# Patient Record
Sex: MALE | Race: WHITE | NOT HISPANIC OR LATINO | Employment: FULL TIME | URBAN - NONMETROPOLITAN AREA
[De-identification: names, ages, dates, MRNs, and addresses within clinical notes are randomized per-mention and may not be internally consistent; named-entity substitution may affect disease eponyms.]

---

## 2023-05-07 DIAGNOSIS — E11.9 TYPE 2 DIABETES MELLITUS WITHOUT COMPLICATIONS (MULTI): ICD-10-CM

## 2023-05-08 RX ORDER — ATORVASTATIN CALCIUM 40 MG/1
TABLET, FILM COATED ORAL
Qty: 90 TABLET | Refills: 3 | Status: SHIPPED | OUTPATIENT
Start: 2023-05-08 | End: 2023-06-29 | Stop reason: SDUPTHER

## 2023-05-26 DIAGNOSIS — E11.9 TYPE 2 DIABETES MELLITUS WITHOUT COMPLICATIONS (MULTI): ICD-10-CM

## 2023-05-26 RX ORDER — METFORMIN HYDROCHLORIDE 1000 MG/1
TABLET ORAL
Qty: 180 TABLET | Refills: 1 | Status: SHIPPED | OUTPATIENT
Start: 2023-05-26 | End: 2023-11-20

## 2023-06-02 ENCOUNTER — APPOINTMENT (OUTPATIENT)
Dept: PRIMARY CARE | Facility: CLINIC | Age: 63
End: 2023-06-02
Payer: COMMERCIAL

## 2023-06-11 DIAGNOSIS — E11.9 TYPE 2 DIABETES MELLITUS WITHOUT COMPLICATION, WITHOUT LONG-TERM CURRENT USE OF INSULIN (MULTI): Primary | ICD-10-CM

## 2023-06-12 RX ORDER — SEMAGLUTIDE 0.68 MG/ML
0.5 INJECTION, SOLUTION SUBCUTANEOUS
Qty: 9 ML | Refills: 2 | Status: SHIPPED | OUTPATIENT
Start: 2023-06-12 | End: 2023-06-29 | Stop reason: SDUPTHER

## 2023-06-29 ENCOUNTER — OFFICE VISIT (OUTPATIENT)
Dept: PRIMARY CARE | Facility: CLINIC | Age: 63
End: 2023-06-29
Payer: COMMERCIAL

## 2023-06-29 VITALS
HEIGHT: 69 IN | DIASTOLIC BLOOD PRESSURE: 82 MMHG | HEART RATE: 74 BPM | SYSTOLIC BLOOD PRESSURE: 116 MMHG | BODY MASS INDEX: 28.77 KG/M2 | OXYGEN SATURATION: 97 % | WEIGHT: 194.25 LBS

## 2023-06-29 DIAGNOSIS — E78.2 MIXED HYPERLIPIDEMIA: Primary | ICD-10-CM

## 2023-06-29 DIAGNOSIS — E11.9 TYPE 2 DIABETES MELLITUS WITHOUT COMPLICATIONS (MULTI): ICD-10-CM

## 2023-06-29 DIAGNOSIS — E11.9 TYPE 2 DIABETES MELLITUS WITHOUT COMPLICATION, WITHOUT LONG-TERM CURRENT USE OF INSULIN (MULTI): ICD-10-CM

## 2023-06-29 PROBLEM — G47.33 OBSTRUCTIVE SLEEP APNEA: Status: ACTIVE | Noted: 2023-06-29

## 2023-06-29 PROBLEM — J44.9 COPD (CHRONIC OBSTRUCTIVE PULMONARY DISEASE) (MULTI): Status: ACTIVE | Noted: 2023-06-29

## 2023-06-29 PROBLEM — E53.8 VITAMIN B12 DEFICIENCY: Status: ACTIVE | Noted: 2023-06-29

## 2023-06-29 PROBLEM — R91.1 LUNG NODULE: Status: ACTIVE | Noted: 2023-06-29

## 2023-06-29 PROBLEM — R93.89 ABNORMAL CHEST CT: Status: ACTIVE | Noted: 2023-06-29

## 2023-06-29 PROBLEM — E11.40 DIABETIC NEUROPATHY (MULTI): Status: ACTIVE | Noted: 2023-06-29

## 2023-06-29 PROBLEM — E78.5 HYPERLIPIDEMIA: Status: ACTIVE | Noted: 2023-06-29

## 2023-06-29 PROBLEM — M72.0 DUPUYTREN'S CONTRACTURE: Status: ACTIVE | Noted: 2023-06-29

## 2023-06-29 PROBLEM — G47.19 DAYTIME HYPERSOMNOLENCE: Status: ACTIVE | Noted: 2023-06-29

## 2023-06-29 LAB — POC HEMOGLOBIN A1C: 6.1 % (ref 4.2–6.5)

## 2023-06-29 PROCEDURE — 3079F DIAST BP 80-89 MM HG: CPT | Performed by: FAMILY MEDICINE

## 2023-06-29 PROCEDURE — 83036 HEMOGLOBIN GLYCOSYLATED A1C: CPT | Performed by: FAMILY MEDICINE

## 2023-06-29 PROCEDURE — 3074F SYST BP LT 130 MM HG: CPT | Performed by: FAMILY MEDICINE

## 2023-06-29 PROCEDURE — 99213 OFFICE O/P EST LOW 20 MIN: CPT | Performed by: FAMILY MEDICINE

## 2023-06-29 RX ORDER — ASPIRIN 81 MG/1
81 TABLET ORAL DAILY
COMMUNITY
End: 2023-06-29 | Stop reason: SDUPTHER

## 2023-06-29 RX ORDER — SEMAGLUTIDE 0.68 MG/ML
0.5 INJECTION, SOLUTION SUBCUTANEOUS
Qty: 3 ML | Refills: 2 | Status: SHIPPED | OUTPATIENT
Start: 2023-06-29 | End: 2023-10-30

## 2023-06-29 RX ORDER — ATORVASTATIN CALCIUM 40 MG/1
40 TABLET, FILM COATED ORAL DAILY
Qty: 90 TABLET | Refills: 3 | Status: SHIPPED | OUTPATIENT
Start: 2023-06-29 | End: 2024-06-23

## 2023-06-29 RX ORDER — ASPIRIN 81 MG/1
81 TABLET ORAL DAILY
Qty: 90 TABLET | Refills: 3 | Status: SHIPPED | OUTPATIENT
Start: 2023-06-29

## 2023-06-29 RX ORDER — BLOOD SUGAR DIAGNOSTIC
STRIP MISCELLANEOUS
COMMUNITY
Start: 2017-11-13 | End: 2023-12-26 | Stop reason: WASHOUT

## 2023-06-29 ASSESSMENT — ENCOUNTER SYMPTOMS
NERVOUS/ANXIOUS: 0
COLOR CHANGE: 0
JOINT SWELLING: 0
APPETITE CHANGE: 0
UNEXPECTED WEIGHT CHANGE: 0
PALPITATIONS: 0
BLOOD IN STOOL: 0
FEVER: 0
DIFFICULTY URINATING: 0
DIARRHEA: 0
CONSTIPATION: 0
HEMATURIA: 0
SEIZURES: 0
CONFUSION: 0
TROUBLE SWALLOWING: 0
SHORTNESS OF BREATH: 0

## 2023-06-29 ASSESSMENT — PATIENT HEALTH QUESTIONNAIRE - PHQ9
SUM OF ALL RESPONSES TO PHQ9 QUESTIONS 1 AND 2: 0
1. LITTLE INTEREST OR PLEASURE IN DOING THINGS: NOT AT ALL
2. FEELING DOWN, DEPRESSED OR HOPELESS: NOT AT ALL

## 2023-06-29 NOTE — PROGRESS NOTES
"Subjective   Patient ID: Lul Corcoran is a 63 y.o. male who presents for Diabetes (6 month follow up ).  Diabetes: Patient's blood sugar is well controlled today.  At last visit we started Ozempic.  He has had some weight loss.  He admits that he has been more active to kayaking.  He is not interested in going up on the Ozempic.    Blood work is up-to-date    On his right shoulder over the AC joint there is a soft tissue mass.  No pain unknown if it is getting any larger.  No erythema    Dupuytren's: Saw Ortho they are currently following this        Review of Systems   Constitutional:  Negative for appetite change, fever and unexpected weight change.   HENT:  Negative for congestion and trouble swallowing.    Eyes:  Negative for visual disturbance.   Respiratory:  Negative for shortness of breath.    Cardiovascular:  Negative for chest pain, palpitations and leg swelling.   Gastrointestinal:  Negative for blood in stool, constipation and diarrhea.   Genitourinary:  Negative for difficulty urinating and hematuria.   Musculoskeletal:  Negative for gait problem and joint swelling.   Skin:  Negative for color change.   Allergic/Immunologic: Negative for immunocompromised state.   Neurological:  Negative for seizures and syncope.   Psychiatric/Behavioral:  Negative for confusion and suicidal ideas. The patient is not nervous/anxious.        Objective   /82   Pulse 74   Ht 1.753 m (5' 9\")   Wt 88.1 kg (194 lb 4 oz)   SpO2 97%   BMI 28.69 kg/m²     Physical Exam  Constitutional:       General: He is not in acute distress.     Appearance: Normal appearance. He is not ill-appearing.   HENT:      Head: Normocephalic and atraumatic.      Right Ear: Tympanic membrane normal.      Left Ear: Tympanic membrane normal.      Nose: Nose normal.      Mouth/Throat:      Mouth: Mucous membranes are moist.   Eyes:      Pupils: Pupils are equal, round, and reactive to light.   Cardiovascular:      Rate and Rhythm: Normal rate " and regular rhythm.      Heart sounds: No murmur heard.     No friction rub. No gallop.   Pulmonary:      Effort: Pulmonary effort is normal.      Breath sounds: Normal breath sounds.   Abdominal:      General: Abdomen is flat. There is no distension.      Palpations: Abdomen is soft.      Tenderness: There is no abdominal tenderness. There is no guarding.      Hernia: No hernia is present.   Musculoskeletal:         General: Normal range of motion.      Comments: Left shoulder over the AC joint appears to be a soft tissue nonmovable mass.  No tenderness palpation   Skin:     General: Skin is warm and dry.   Neurological:      General: No focal deficit present.      Mental Status: He is alert. Mental status is at baseline.      Cranial Nerves: No cranial nerve deficit.      Motor: No weakness.      Gait: Gait normal.   Psychiatric:         Mood and Affect: Mood normal.         Behavior: Behavior normal.         Thought Content: Thought content normal.         Judgment: Judgment normal.         Assessment/Plan   Problem List Items Addressed This Visit       Diabetes mellitus, type 2 (CMS/HCC)    Relevant Medications    semaglutide (Ozempic) 0.25 mg or 0.5 mg (2 mg/3 mL) pen injector    Hyperlipidemia - Primary    Relevant Medications    aspirin 81 mg EC tablet     Other Visit Diagnoses       Type 2 diabetes mellitus without complications (CMS/[facility])        Relevant Medications    aspirin 81 mg EC tablet    atorvastatin (Lipitor) 40 mg tablet    Other Relevant Orders    POCT glycosylated hemoglobin (Hb A1C) manually resulted          Assessment:  #Diabetes: 6.0% (6/23) from 13.2%(11/17), micro: <30(1/22), foot:nml 5/22, eye: [facility] 2/22  -Metformin 1000 mg twice daily, ozempic, atorvastatin 40 mg    #Diabetic neuropathy    #Hyperlipidemia    #Tobacco use: Failed Chantix    #Mild SHANICE    #Pneumonitis: Infectious versus hypersensitivity versus interstitialâ€“CT scan 10/21    #B12 deficiency    #Dupuytren's of left  hand      #Right mass over shoulder: Secondary to ganglion cyst versus lipoma  -Patient would like just to follow    Health Maintenance Reminder:  -Blood Work: 1/24  -PSA: 1/24  -Hep C: neg  -Colonoscopy: 2025  -Lung Cancer: ordered  -AAA: 65-79 smoker.   -shingles: completed  -Pneumovax: 2/23  -Flu: Yearly

## 2023-09-08 ENCOUNTER — OFFICE VISIT (OUTPATIENT)
Dept: PRIMARY CARE | Facility: CLINIC | Age: 63
End: 2023-09-08
Payer: COMMERCIAL

## 2023-09-08 VITALS
DIASTOLIC BLOOD PRESSURE: 78 MMHG | OXYGEN SATURATION: 99 % | HEART RATE: 57 BPM | SYSTOLIC BLOOD PRESSURE: 128 MMHG | BODY MASS INDEX: 28.73 KG/M2 | HEIGHT: 69 IN | WEIGHT: 194 LBS

## 2023-09-08 DIAGNOSIS — M75.41 SUBACROMIAL IMPINGEMENT OF RIGHT SHOULDER: Primary | ICD-10-CM

## 2023-09-08 PROCEDURE — 3074F SYST BP LT 130 MM HG: CPT

## 2023-09-08 PROCEDURE — 3078F DIAST BP <80 MM HG: CPT

## 2023-09-08 PROCEDURE — 99213 OFFICE O/P EST LOW 20 MIN: CPT

## 2023-09-08 ASSESSMENT — ENCOUNTER SYMPTOMS
WEAKNESS: 1
NEUROLOGIC COMPLAINT: 1
FOCAL SENSORY LOSS: 1
FOCAL WEAKNESS: 1

## 2023-09-08 NOTE — PATIENT INSTRUCTIONS
PT referral - can go where you'd like   Heat - heating pain, warm compress   Rest  Ibuprofen, aleve   MRI if no improvement

## 2023-09-08 NOTE — PROGRESS NOTES
Subjective   Patient ID: Lul Corcoran is a 63 y.o. male who presents for Shoulder Pain, Mass (R shoulder), and Numbness.  HPI  Lul presents for numbness and tingling in his R arm that started a couple weeks ago.  She says he has noticed he has lost strength in it, his  strength is weakened.   He is a  and could barely get squeeze a pipette with his R hand.   He thought maybe he had a pinched nerve, took 800 ibuprofen, did not help.  Pain right under shoulder blade as well.  No injury that he can think of but he was kayaking several weeks ago prior to this.   No heavy lifting recently.  He cannot fully straighten his R fingers. He feels numbness in all his fingers.   Not dropping things, no tremor.  No neck pain, no headache.    Lump on R shoulder and lump on back of neck   Right mass over shoulder: Secondary to ganglion cyst versus lipoma     Past Surgical History:   Procedure Laterality Date    COLONOSCOPY  02/16/2015    Complete Colonoscopy    OTHER SURGICAL HISTORY  09/29/2021    Foot surgery    VASECTOMY  01/19/2015    Surgery Vas Deferens Vasectomy      Past Medical History:   Diagnosis Date    Encounter for immunization 01/19/2015    Need for Tdap vaccination    Encounter for screening for cardiovascular disorders 01/19/2015    Encounter for screening for cardiovascular disorders    Encounter for screening for diabetes mellitus 01/19/2015    Encounter for screening for diabetes mellitus    Encounter for screening for lipoid disorders 01/19/2015    Encounter for screening for lipoid disorders    Encounter for screening for malignant neoplasm of prostate 05/12/2015    Encounter for screening for malignant neoplasm of prostate    Encounter for screening for malignant neoplasm of prostate 11/13/2017    Prostate cancer screening    Lyme disease, unspecified     Erythema migrans (Lyme disease)    Other hemorrhoids 02/16/2015    Internal hemorrhoids    Pain in unspecified joint 11/13/2017    Multiple  "joint pain    Personal history of other diseases of the respiratory system 02/09/2016    History of influenza    Personal history of other endocrine, nutritional and metabolic disease 05/12/2015    History of hyperglycemia    Personal history of other endocrine, nutritional and metabolic disease 11/13/2017    History of hyperglycemia    Personal history of other infectious and parasitic diseases 12/02/2016    History of herpes zoster    Personal history of other specified conditions 11/13/2017    History of hemoptysis    Unspecified otitis externa, unspecified ear 11/02/2018    Otitis externa     Social History     Tobacco Use    Smoking status: Every Day     Types: Cigarettes    Smokeless tobacco: Never   Substance Use Topics    Alcohol use: Never        Review of Systems  10 point review of systems performed and is negative except as noted in the HPI.      Current Outpatient Medications:     aspirin 81 mg EC tablet, Take 1 tablet (81 mg) by mouth once daily. as directed, Disp: 90 tablet, Rfl: 3    atorvastatin (Lipitor) 40 mg tablet, Take 1 tablet (40 mg) by mouth once daily., Disp: 90 tablet, Rfl: 3    metFORMIN (Glucophage) 1,000 mg tablet, TAKE 1 TABLET BY MOUTH EVERY 12 HOURS, Disp: 180 tablet, Rfl: 1    semaglutide (Ozempic) 0.25 mg or 0.5 mg (2 mg/3 mL) pen injector, Inject 0.5 mg under the skin every 7 days., Disp: 3 mL, Rfl: 2    OneTouch Ultra Test strip, TEST ONCE DAILY AS DIRECTED icd e11.9, Disp: , Rfl:      Objective   /78   Pulse 57   Ht 1.753 m (5' 9\")   Wt 88 kg (194 lb)   SpO2 99%   BMI 28.65 kg/m²     Physical Exam  Vitals and nursing note reviewed.   Constitutional:       General: He is not in acute distress.     Appearance: Normal appearance. He is not ill-appearing.   Musculoskeletal:      Right shoulder: Tenderness (posterior shoulder) present. No swelling, deformity, effusion, laceration or crepitus. Decreased range of motion (internal rotation, extension).      Right upper arm: " Normal. No swelling or tenderness.      Right wrist: Normal. No swelling or tenderness. Normal range of motion. Normal pulse.      Left wrist: Normal.      Right hand: No swelling or tenderness. Normal range of motion. Decreased strength (). Decreased sensation. Normal capillary refill. Normal pulse.      Left hand: Normal strength. Normal sensation.      Cervical back: Normal range of motion and neck supple. No rigidity or tenderness.      Comments: Positive Neer and wright test. Negative drop arm, empty can, apprehension, and Yergason.    Skin:     Comments: L shoulder AC joint - palpable soft tissue movable mass.  No tenderness to palpation.   Neurological:      Mental Status: He is alert.         Assessment/Plan   Problem List Items Addressed This Visit    None  Visit Diagnoses       Subacromial impingement of right shoulder    -  Primary    Relevant Orders    Referral to Physical Therapy        PT referral - can go where you'd like   Also discussed referral to [facility]   Heat - heating pain, warm compress   Rest  Ibuprofen, aleve   MRI if no improvement     Discussed at visit any disease processes that were of concern as well as the risks, benefits and instructions on any new medication provided. Patient (and/or caretaker of patient if present) stated all questions were answered, and they voiced understanding of instructions.

## 2023-10-29 DIAGNOSIS — E11.9 TYPE 2 DIABETES MELLITUS WITHOUT COMPLICATION, WITHOUT LONG-TERM CURRENT USE OF INSULIN (MULTI): ICD-10-CM

## 2023-10-30 RX ORDER — SEMAGLUTIDE 0.68 MG/ML
INJECTION, SOLUTION SUBCUTANEOUS
Qty: 2 ML | Refills: 2 | Status: SHIPPED | OUTPATIENT
Start: 2023-10-30 | End: 2023-11-11 | Stop reason: SDUPTHER

## 2023-11-11 DIAGNOSIS — E11.9 TYPE 2 DIABETES MELLITUS WITHOUT COMPLICATION, WITHOUT LONG-TERM CURRENT USE OF INSULIN (MULTI): ICD-10-CM

## 2023-11-11 RX ORDER — SEMAGLUTIDE 0.68 MG/ML
0.5 INJECTION, SOLUTION SUBCUTANEOUS
Qty: 3 ML | Refills: 2 | Status: SHIPPED | OUTPATIENT
Start: 2023-11-11 | End: 2024-06-10

## 2023-11-20 DIAGNOSIS — E11.9 TYPE 2 DIABETES MELLITUS WITHOUT COMPLICATIONS (MULTI): ICD-10-CM

## 2023-11-20 RX ORDER — METFORMIN HYDROCHLORIDE 1000 MG/1
TABLET ORAL
Qty: 180 TABLET | Refills: 1 | Status: SHIPPED | OUTPATIENT
Start: 2023-11-20 | End: 2024-05-28

## 2023-12-21 ENCOUNTER — OFFICE VISIT (OUTPATIENT)
Dept: PRIMARY CARE | Facility: CLINIC | Age: 63
End: 2023-12-21
Payer: COMMERCIAL

## 2023-12-21 VITALS
DIASTOLIC BLOOD PRESSURE: 85 MMHG | BODY MASS INDEX: 28.71 KG/M2 | HEART RATE: 84 BPM | OXYGEN SATURATION: 98 % | SYSTOLIC BLOOD PRESSURE: 135 MMHG | WEIGHT: 194.4 LBS

## 2023-12-21 DIAGNOSIS — J43.2 CENTRILOBULAR EMPHYSEMA (MULTI): ICD-10-CM

## 2023-12-21 DIAGNOSIS — D17.21 LIPOMA OF RIGHT SHOULDER: ICD-10-CM

## 2023-12-21 DIAGNOSIS — G89.29 CHRONIC RIGHT SHOULDER PAIN: ICD-10-CM

## 2023-12-21 DIAGNOSIS — E11.42 DIABETIC POLYNEUROPATHY ASSOCIATED WITH TYPE 2 DIABETES MELLITUS (MULTI): ICD-10-CM

## 2023-12-21 DIAGNOSIS — E11.9 TYPE 2 DIABETES MELLITUS WITHOUT COMPLICATION, WITH LONG-TERM CURRENT USE OF INSULIN (MULTI): Primary | ICD-10-CM

## 2023-12-21 DIAGNOSIS — M25.511 CHRONIC RIGHT SHOULDER PAIN: ICD-10-CM

## 2023-12-21 DIAGNOSIS — Z79.4 TYPE 2 DIABETES MELLITUS WITHOUT COMPLICATION, WITH LONG-TERM CURRENT USE OF INSULIN (MULTI): Primary | ICD-10-CM

## 2023-12-21 LAB
CREAT UR-MCNC: 110.4 MG/DL (ref 20–370)
MICROALBUMIN UR-MCNC: <7 MG/L
MICROALBUMIN/CREAT UR: NORMAL MG/G{CREAT}
POC HEMOGLOBIN A1C: 6.3 % (ref 4.2–6.5)

## 2023-12-21 PROCEDURE — 99213 OFFICE O/P EST LOW 20 MIN: CPT | Performed by: FAMILY MEDICINE

## 2023-12-21 PROCEDURE — 3075F SYST BP GE 130 - 139MM HG: CPT | Performed by: FAMILY MEDICINE

## 2023-12-21 PROCEDURE — 3062F POS MACROALBUMINURIA REV: CPT | Performed by: FAMILY MEDICINE

## 2023-12-21 PROCEDURE — 82043 UR ALBUMIN QUANTITATIVE: CPT

## 2023-12-21 PROCEDURE — 83036 HEMOGLOBIN GLYCOSYLATED A1C: CPT | Performed by: FAMILY MEDICINE

## 2023-12-21 PROCEDURE — 82570 ASSAY OF URINE CREATININE: CPT

## 2023-12-21 PROCEDURE — 3079F DIAST BP 80-89 MM HG: CPT | Performed by: FAMILY MEDICINE

## 2023-12-21 ASSESSMENT — ENCOUNTER SYMPTOMS
CONFUSION: 0
BLOOD IN STOOL: 0
DIARRHEA: 0
JOINT SWELLING: 0
APPETITE CHANGE: 0
CONSTIPATION: 0
FEVER: 0
HEMATURIA: 0
COLOR CHANGE: 0
TROUBLE SWALLOWING: 0
SEIZURES: 0
PALPITATIONS: 0
NERVOUS/ANXIOUS: 0
DIFFICULTY URINATING: 0
UNEXPECTED WEIGHT CHANGE: 0
SHORTNESS OF BREATH: 0

## 2023-12-21 NOTE — PROGRESS NOTES
Subjective   Patient ID: Lul Corcoran is a 63 y.o. male who presents for Annual Exam (6 M).  Right shoulder pain:  -tried PT in [place]  -saw [facility]  -now going to [facility], had and injection  -no notes from [facility]   -has a lump on shoulder     No change in dm neuropathy  Gets eyes checked at [facility]      Diabetes  He has type 2 diabetes mellitus. No MedicAlert identification noted. The initial diagnosis of diabetes was made 5 years ago. Pertinent negatives for hypoglycemia include no confusion, nervousness/anxiousness or seizures. Pertinent negatives for diabetes include no chest pain. Symptoms are stable. Risk factors for coronary artery disease include dyslipidemia and tobacco exposure. Current diabetic treatment includes oral agent (dual therapy). He is compliant with treatment all of the time. He has not had a previous visit with a dietitian. He does not see a podiatrist.Eye exam is current.       Review of Systems   Constitutional:  Negative for appetite change, fever and unexpected weight change.   HENT:  Negative for congestion and trouble swallowing.    Eyes:  Negative for visual disturbance.   Respiratory:  Negative for shortness of breath.    Cardiovascular:  Negative for chest pain, palpitations and leg swelling.   Gastrointestinal:  Negative for blood in stool, constipation and diarrhea.   Genitourinary:  Negative for difficulty urinating and hematuria.   Musculoskeletal:  Negative for gait problem and joint swelling.   Skin:  Negative for color change.   Allergic/Immunologic: Negative for immunocompromised state.   Neurological:  Negative for seizures and syncope.   Psychiatric/Behavioral:  Negative for confusion and suicidal ideas. The patient is not nervous/anxious.        Objective   /85   Pulse 84   Wt 88.2 kg (194 lb 6.4 oz)   SpO2 98%   BMI 28.71 kg/m²     Physical Exam  Constitutional:       General: He is not in acute distress.     Appearance: Normal appearance. He  is not ill-appearing.   HENT:      Head: Normocephalic and atraumatic.      Right Ear: Tympanic membrane normal.      Left Ear: Tympanic membrane normal.      Nose: Nose normal.      Mouth/Throat:      Mouth: Mucous membranes are moist.   Eyes:      Pupils: Pupils are equal, round, and reactive to light.   Cardiovascular:      Rate and Rhythm: Normal rate and regular rhythm.      Heart sounds: No murmur heard.     No friction rub. No gallop.   Pulmonary:      Effort: Pulmonary effort is normal.      Breath sounds: Normal breath sounds.   Abdominal:      General: Abdomen is flat. There is no distension.      Palpations: Abdomen is soft.      Tenderness: There is no abdominal tenderness. There is no guarding.      Hernia: No hernia is present.   Musculoskeletal:         General: Normal range of motion.      Comments: Left shoulder over the AC joint appears to be a soft tissue nonmovable mass.  No tenderness palpation   Skin:     General: Skin is warm and dry.      Comments: Right shoulder soft tissue mass    Neurological:      General: No focal deficit present.      Mental Status: He is alert. Mental status is at baseline.      Cranial Nerves: No cranial nerve deficit.      Motor: No weakness.      Gait: Gait normal.   Psychiatric:         Mood and Affect: Mood normal.         Behavior: Behavior normal.         Thought Content: Thought content normal.         Judgment: Judgment normal.       Assessment/Plan   Problem List Items Addressed This Visit       COPD (chronic obstructive pulmonary disease) (CMS/[facility])    Diabetic neuropathy (CMS/[facility])   Assessment:  #Diabetes: 6.3% (12/23) ,micro: <30(1/22), foot:nml 5/22, eye: [facility]   -Metformin 1000 mg twice daily, ozempic, atorvastatin 40 mg    #Diabetic neuropathy    #Hyperlipidemia  -lipitor 40mg     #Tobacco use: Failed Chantix    #Mild SHANICE    #Pneumonitis: Infectious versus hypersensitivity versus interstitialâ€“CT scan 10/21    #B12 deficiency    #Dupuytren's of  left hand      #Right mass over shoulder: Secondary to lipoma likely vs ganglion cyst   -might get removed w/ shoulder surgery    #right shoulder pain:  -followed by [facility] Maintenance Reminder:  -Blood Work: 1/24  -PSA: 1/24  -Hep C: neg  -Colonoscopy: 2025  -Lung Cancer: wants to wait until 2024 not covered   -AAA: 65-79 smoker.   -shingles: completed  -Pneumovax: 65y   -Flu: Yearly

## 2024-05-19 ENCOUNTER — HOSPITAL ENCOUNTER (EMERGENCY)
Facility: HOSPITAL | Age: 64
Discharge: HOME | End: 2024-05-19
Payer: COMMERCIAL

## 2024-05-19 VITALS
RESPIRATION RATE: 16 BRPM | HEIGHT: 69 IN | DIASTOLIC BLOOD PRESSURE: 72 MMHG | SYSTOLIC BLOOD PRESSURE: 114 MMHG | WEIGHT: 185.19 LBS | OXYGEN SATURATION: 98 % | HEART RATE: 77 BPM | TEMPERATURE: 97.9 F | BODY MASS INDEX: 27.43 KG/M2
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
## 2024-05-19 DIAGNOSIS — S10.86XA TICK BITE OF OTHER PART OF NECK, INITIAL ENCOUNTER: Primary | ICD-10-CM

## 2024-05-19 PROCEDURE — 2500000001 HC RX 250 WO HCPCS SELF ADMINISTERED DRUGS (ALT 637 FOR MEDICARE OP): Performed by: PHYSICIAN ASSISTANT

## 2024-05-19 PROCEDURE — 99283 EMERGENCY DEPT VISIT LOW MDM: CPT

## 2024-05-19 RX ORDER — DOXYCYCLINE HYCLATE 100 MG
200 TABLET ORAL ONCE
Status: COMPLETED | OUTPATIENT
Start: 2024-05-19 | End: 2024-05-19

## 2024-05-19 RX ADMIN — DOXYCYCLINE HYCLATE 200 MG: 100 TABLET, COATED ORAL at 12:34

## 2024-05-19 ASSESSMENT — PAIN - FUNCTIONAL ASSESSMENT: PAIN_FUNCTIONAL_ASSESSMENT: 0-10

## 2024-05-19 ASSESSMENT — COLUMBIA-SUICIDE SEVERITY RATING SCALE - C-SSRS
6. HAVE YOU EVER DONE ANYTHING, STARTED TO DO ANYTHING, OR PREPARED TO DO ANYTHING TO END YOUR LIFE?: NO
2. HAVE YOU ACTUALLY HAD ANY THOUGHTS OF KILLING YOURSELF?: NO
1. IN THE PAST MONTH, HAVE YOU WISHED YOU WERE DEAD OR WISHED YOU COULD GO TO SLEEP AND NOT WAKE UP?: NO

## 2024-05-19 ASSESSMENT — LIFESTYLE VARIABLES
EVER HAD A DRINK FIRST THING IN THE MORNING TO STEADY YOUR NERVES TO GET RID OF A HANGOVER: NO
EVER FELT BAD OR GUILTY ABOUT YOUR DRINKING: NO
HAVE PEOPLE ANNOYED YOU BY CRITICIZING YOUR DRINKING: NO
HAVE YOU EVER FELT YOU SHOULD CUT DOWN ON YOUR DRINKING: NO
TOTAL SCORE: 0

## 2024-05-19 NOTE — ED PROVIDER NOTES
HPI   Chief Complaint   Patient presents with    Tick Removal     Pt has been camping for the last week and noticed a black spot on rt side of his neck and he believes it is a tick.       This is a 63-year-old male who denies significant PMH presenting for evaluation of tick bite of the lower part of his neck.  Possibly been attached since Friday night.  Just noticed it this morning.  Denies any symptoms of although there is some redness in the area.      History provided by:  Patient   used: No                        [place] Coma Scale Score: 15                     Patient History   Past Medical History:   Diagnosis Date    Encounter for immunization 01/19/2015    Need for Tdap vaccination    Encounter for screening for cardiovascular disorders 01/19/2015    Encounter for screening for cardiovascular disorders    Encounter for screening for diabetes mellitus 01/19/2015    Encounter for screening for diabetes mellitus    Encounter for screening for lipoid disorders 01/19/2015    Encounter for screening for lipoid disorders    Encounter for screening for malignant neoplasm of prostate 05/12/2015    Encounter for screening for malignant neoplasm of prostate    Encounter for screening for malignant neoplasm of prostate 11/13/2017    Prostate cancer screening    Lyme disease, unspecified     Erythema migrans (Lyme disease)    Other hemorrhoids 02/16/2015    Internal hemorrhoids    Pain in unspecified joint 11/13/2017    Multiple joint pain    Personal history of other diseases of the respiratory system 02/09/2016    History of influenza    Personal history of other endocrine, nutritional and metabolic disease 05/12/2015    History of hyperglycemia    Personal history of other endocrine, nutritional and metabolic disease 11/13/2017    History of hyperglycemia    Personal history of other infectious and parasitic diseases 12/02/2016    History of herpes zoster    Personal history of other specified  conditions 11/13/2017    History of hemoptysis    Unspecified otitis externa, unspecified ear 11/02/2018    Otitis externa     Past Surgical History:   Procedure Laterality Date    COLONOSCOPY  02/16/2015    Complete Colonoscopy    OTHER SURGICAL HISTORY  09/29/2021    Foot surgery    VASECTOMY  01/19/2015    Surgery Vas Deferens Vasectomy     No family history on file.  Social History     Tobacco Use    Smoking status: Every Day     Current packs/day: 1.00     Types: Cigarettes    Smokeless tobacco: Never   Vaping Use    Vaping status: Never Used   Substance Use Topics    Alcohol use: Never    Drug use: Never       Physical Exam   ED Triage Vitals [05/19/24 1136]   Temperature Heart Rate Respirations BP   36.6 °C (97.9 °F) 88 16 134/79      Pulse Ox Temp src Heart Rate Source Patient Position   97 % -- -- --      BP Location FiO2 (%)     -- --       Physical Exam  Constitutional:       Appearance: Normal appearance.   Cardiovascular:      Rate and Rhythm: Normal rate and regular rhythm.      Pulses: Normal pulses.      Heart sounds: Normal heart sounds.   Pulmonary:      Effort: Pulmonary effort is normal.      Breath sounds: Normal breath sounds.   Skin:     Comments: There is a tiny tic attached to the right side of the lower neck with a minimal amount of surrounding erythema.  It does not appear to be engorged.   Neurological:      Mental Status: He is alert.         ED Course & MDM   Diagnoses as of 05/19/24 1230   Tick bite of other part of neck, initial encounter       Medical Decision Making  Tick successfully removed.  Given a dose of doxycycline here.  Given return precautions and advisement on supportive care.      Disclaimer: This note was dictated using speech recognition software. An attempt at proofreading was made to minimize errors. Minor errors in transcription may be present. Please call if questions.        Procedure  Foreign Body Removal - Embedded    Performed by: Wade Stewart,  RICHARD  Authorized by: Wade Stewart PA-C    Consent:     Consent obtained:  Verbal    Consent given by:  Patient  Location:     Location:  Neck    Neck location:  R anterior    Depth:  Intradermal    Tendon involvement:  None  Pre-procedure details:     Imaging:  None    Neurovascular status: intact    Anesthesia:     Anesthesia method:  None  Procedure type:     Procedure complexity:  Simple  Procedure details:     Localization method:  Visualized    Removal mechanism:  Forceps    Foreign bodies recovered:  None    Intact foreign body removal: yes    Post-procedure details:     Neurovascular status: intact      Confirmation:  No additional foreign bodies on visualization    Skin closure:  None    Dressing:  Open (no dressing)    Procedure completion:  Tolerated       Wade Stewart PA-C  05/19/24 1235

## 2024-05-19 NOTE — ED NOTES
Pt arrives with c/o tick bite to right neck. Pt reports the tick may have become imbedded yesterday. Tick is very small. Pt denies other symptoms. Pt in no obvious distress.      Jerry Vang RN  05/19/24 5826

## 2024-05-19 NOTE — ED TRIAGE NOTES
Pt has been camping for the last week and noticed a black spot on rt side of his neck and he believes it is a tick.

## 2024-05-20 ENCOUNTER — TELEPHONE (OUTPATIENT)
Dept: PRIMARY CARE | Facility: CLINIC | Age: 64
End: 2024-05-20
Payer: COMMERCIAL

## 2024-05-20 NOTE — TELEPHONE ENCOUNTER
Pt wanted you to know that he went to the ER on [place] because he had a tick and they took the tick out and gave him abx but they told him to let you know if to see you if he notices any change in the area.

## 2024-05-25 DIAGNOSIS — E11.9 TYPE 2 DIABETES MELLITUS WITHOUT COMPLICATIONS (MULTI): ICD-10-CM

## 2024-05-28 RX ORDER — METFORMIN HYDROCHLORIDE 1000 MG/1
TABLET ORAL
Qty: 180 TABLET | Refills: 1 | Status: SHIPPED | OUTPATIENT
Start: 2024-05-28

## 2024-06-08 DIAGNOSIS — E11.9 TYPE 2 DIABETES MELLITUS WITHOUT COMPLICATION, WITHOUT LONG-TERM CURRENT USE OF INSULIN (MULTI): ICD-10-CM

## 2024-06-10 RX ORDER — SEMAGLUTIDE 0.68 MG/ML
INJECTION, SOLUTION SUBCUTANEOUS
Qty: 3 ML | Refills: 8 | Status: SHIPPED | OUTPATIENT
Start: 2024-06-10

## 2024-08-01 DIAGNOSIS — E11.9 TYPE 2 DIABETES MELLITUS WITHOUT COMPLICATIONS (MULTI): ICD-10-CM

## 2024-08-01 RX ORDER — ATORVASTATIN CALCIUM 40 MG/1
40 TABLET, FILM COATED ORAL DAILY
Qty: 90 TABLET | Refills: 3 | Status: SHIPPED | OUTPATIENT
Start: 2024-08-01

## 2024-08-09 ENCOUNTER — APPOINTMENT (OUTPATIENT)
Dept: PRIMARY CARE | Facility: CLINIC | Age: 64
End: 2024-08-09
Payer: COMMERCIAL

## 2024-08-09 VITALS
SYSTOLIC BLOOD PRESSURE: 128 MMHG | DIASTOLIC BLOOD PRESSURE: 82 MMHG | WEIGHT: 186 LBS | BODY MASS INDEX: 27.47 KG/M2 | HEART RATE: 70 BPM | OXYGEN SATURATION: 98 %

## 2024-08-09 DIAGNOSIS — R53.82 CHRONIC FATIGUE: ICD-10-CM

## 2024-08-09 DIAGNOSIS — Z12.5 PROSTATE CANCER SCREENING: ICD-10-CM

## 2024-08-09 DIAGNOSIS — Z13.220 LIPID SCREENING: ICD-10-CM

## 2024-08-09 DIAGNOSIS — Z13.1 DIABETES MELLITUS SCREENING: ICD-10-CM

## 2024-08-09 DIAGNOSIS — E11.9 TYPE 2 DIABETES MELLITUS WITHOUT COMPLICATION, WITHOUT LONG-TERM CURRENT USE OF INSULIN (MULTI): ICD-10-CM

## 2024-08-09 DIAGNOSIS — Z13.0 SCREENING FOR DEFICIENCY ANEMIA: Primary | ICD-10-CM

## 2024-08-09 LAB
ALBUMIN SERPL BCP-MCNC: 3.8 G/DL (ref 3.4–5)
ALP SERPL-CCNC: 56 U/L (ref 33–136)
ALT SERPL W P-5'-P-CCNC: 17 U/L (ref 10–52)
ANION GAP SERPL CALC-SCNC: 14 MMOL/L (ref 10–20)
AST SERPL W P-5'-P-CCNC: 20 U/L (ref 9–39)
BILIRUB SERPL-MCNC: 0.6 MG/DL (ref 0–1.2)
BUN SERPL-MCNC: 15 MG/DL (ref 6–23)
CALCIUM SERPL-MCNC: 9.5 MG/DL (ref 8.6–10.3)
CHLORIDE SERPL-SCNC: 103 MMOL/L (ref 98–107)
CHOLEST SERPL-MCNC: 115 MG/DL (ref 0–199)
CHOLESTEROL/HDL RATIO: 2.8
CO2 SERPL-SCNC: 25 MMOL/L (ref 21–32)
CREAT SERPL-MCNC: 0.68 MG/DL (ref 0.5–1.3)
EGFRCR SERPLBLD CKD-EPI 2021: >90 ML/MIN/1.73M*2
ERYTHROCYTE [DISTWIDTH] IN BLOOD BY AUTOMATED COUNT: 13.2 % (ref 11.5–14.5)
GLUCOSE SERPL-MCNC: 100 MG/DL (ref 74–99)
HCT VFR BLD AUTO: 50.5 % (ref 41–52)
HDLC SERPL-MCNC: 40.9 MG/DL
HGB BLD-MCNC: 15.9 G/DL (ref 13.5–17.5)
LDLC SERPL CALC-MCNC: 43 MG/DL
MCH RBC QN AUTO: 29.4 PG (ref 26–34)
MCHC RBC AUTO-ENTMCNC: 31.5 G/DL (ref 32–36)
MCV RBC AUTO: 94 FL (ref 80–100)
NON HDL CHOLESTEROL: 74 MG/DL (ref 0–149)
NRBC BLD-RTO: 0 /100 WBCS (ref 0–0)
PLATELET # BLD AUTO: 265 X10*3/UL (ref 150–450)
POC HEMOGLOBIN A1C: 6.1 % (ref 4.2–6.5)
POTASSIUM SERPL-SCNC: 4.9 MMOL/L (ref 3.5–5.3)
PROT SERPL-MCNC: 6.3 G/DL (ref 6.4–8.2)
RBC # BLD AUTO: 5.4 X10*6/UL (ref 4.5–5.9)
SODIUM SERPL-SCNC: 137 MMOL/L (ref 136–145)
TRIGL SERPL-MCNC: 155 MG/DL (ref 0–149)
VLDL: 31 MG/DL (ref 0–40)
WBC # BLD AUTO: 10.1 X10*3/UL (ref 4.4–11.3)

## 2024-08-09 PROCEDURE — 83036 HEMOGLOBIN GLYCOSYLATED A1C: CPT | Performed by: FAMILY MEDICINE

## 2024-08-09 PROCEDURE — 3079F DIAST BP 80-89 MM HG: CPT | Performed by: FAMILY MEDICINE

## 2024-08-09 PROCEDURE — 99213 OFFICE O/P EST LOW 20 MIN: CPT | Performed by: FAMILY MEDICINE

## 2024-08-09 PROCEDURE — 80053 COMPREHEN METABOLIC PANEL: CPT

## 2024-08-09 PROCEDURE — 80061 LIPID PANEL: CPT

## 2024-08-09 PROCEDURE — 85027 COMPLETE CBC AUTOMATED: CPT

## 2024-08-09 PROCEDURE — 84153 ASSAY OF PSA TOTAL: CPT

## 2024-08-09 PROCEDURE — 3074F SYST BP LT 130 MM HG: CPT | Performed by: FAMILY MEDICINE

## 2024-08-09 ASSESSMENT — ENCOUNTER SYMPTOMS
BLOOD IN STOOL: 0
NERVOUS/ANXIOUS: 0
COLOR CHANGE: 0
JOINT SWELLING: 0
SEIZURES: 0
APPETITE CHANGE: 0
FEVER: 0
UNEXPECTED WEIGHT CHANGE: 0
DIARRHEA: 0
DIFFICULTY URINATING: 0
PALPITATIONS: 0
SHORTNESS OF BREATH: 0
TROUBLE SWALLOWING: 0
CONSTIPATION: 0
HEMATURIA: 0
CONFUSION: 0

## 2024-08-09 ASSESSMENT — PATIENT HEALTH QUESTIONNAIRE - PHQ9
2. FEELING DOWN, DEPRESSED OR HOPELESS: NOT AT ALL
SUM OF ALL RESPONSES TO PHQ9 QUESTIONS 1 AND 2: 0
1. LITTLE INTEREST OR PLEASURE IN DOING THINGS: NOT AT ALL

## 2024-08-09 NOTE — PROGRESS NOTES
Subjective   Patient ID: Lul Corcoran is a 64 y.o. male who presents for Diabetes.  DM:  Denies significant neuropathy- somedays will have it  Microalbumin up-to-date  A1c controlled    Right shoulder pain:  -[facility] following    Wants to see derm- needs good skin check       Diabetes  He has type 2 diabetes mellitus. No MedicAlert identification noted. The initial diagnosis of diabetes was made 5 years ago. Pertinent negatives for hypoglycemia include no confusion, nervousness/anxiousness or seizures. Pertinent negatives for diabetes include no chest pain. Symptoms are stable. Risk factors for coronary artery disease include dyslipidemia and tobacco exposure. Current diabetic treatment includes oral agent (dual therapy). He is compliant with treatment all of the time. He has not had a previous visit with a dietitian. He does not see a podiatrist.Eye exam is current.       Review of Systems   Constitutional:  Negative for appetite change, fever and unexpected weight change.   HENT:  Negative for congestion and trouble swallowing.    Eyes:  Negative for visual disturbance.   Respiratory:  Negative for shortness of breath.    Cardiovascular:  Negative for chest pain, palpitations and leg swelling.   Gastrointestinal:  Negative for blood in stool, constipation and diarrhea.   Genitourinary:  Negative for difficulty urinating and hematuria.   Musculoskeletal:  Negative for gait problem and joint swelling.   Skin:  Negative for color change.   Allergic/Immunologic: Negative for immunocompromised state.   Neurological:  Negative for seizures and syncope.   Psychiatric/Behavioral:  Negative for confusion and suicidal ideas. The patient is not nervous/anxious.        Objective   /82   Pulse 70   Wt 84.4 kg (186 lb)   SpO2 98%   BMI 27.47 kg/m²     Physical Exam  Constitutional:       General: He is not in acute distress.     Appearance: Normal appearance. He is not ill-appearing.   HENT:      Head:  Normocephalic and atraumatic.      Right Ear: Tympanic membrane normal.      Left Ear: Tympanic membrane normal.      Nose: Nose normal.      Mouth/Throat:      Mouth: Mucous membranes are moist.   Eyes:      Pupils: Pupils are equal, round, and reactive to light.   Cardiovascular:      Rate and Rhythm: Normal rate and regular rhythm.      Heart sounds: No murmur heard.     No friction rub. No gallop.   Pulmonary:      Effort: Pulmonary effort is normal.      Breath sounds: Normal breath sounds.   Abdominal:      General: Abdomen is flat. There is no distension.      Palpations: Abdomen is soft.      Tenderness: There is no abdominal tenderness. There is no guarding.      Hernia: No hernia is present.   Musculoskeletal:         General: Normal range of motion.      Comments: Left shoulder over the AC joint appears to be a soft tissue nonmovable mass.  No tenderness palpation   Skin:     General: Skin is warm and dry.      Comments: Right shoulder soft tissue mass    Neurological:      General: No focal deficit present.      Mental Status: He is alert. Mental status is at baseline.      Cranial Nerves: No cranial nerve deficit.      Motor: No weakness.      Gait: Gait normal.   Psychiatric:         Mood and Affect: Mood normal.         Behavior: Behavior normal.         Thought Content: Thought content normal.         Judgment: Judgment normal.         Assessment/Plan   Problem List Items Addressed This Visit       Diabetes mellitus, type 2 (Multi)    Relevant Orders    POCT glycosylated hemoglobin (Hb A1C) manually resulted     Other Visit Diagnoses       Screening for deficiency anemia    -  Primary    Relevant Orders    CBC    Diabetes mellitus screening        Relevant Orders    Comprehensive Metabolic Panel    Lipid screening        Relevant Orders    Lipid Panel    Prostate cancer screening        Relevant Orders    Prostate Specific Antigen, Screen        Assessment:  #Diabetes: 6.3% (12/23) ,micro:  <30(12/23), foot:nml 5/22, eye: [facility]   -Metformin 1000 mg twice daily, ozempic, atorvastatin 40 mg  -check b12 2/2 fatigue    #Diabetic neuropathy    #Hyperlipidemia  -lipitor 40mg     #Tobacco use: Failed Chantix    #Mild SHANICE    #Pneumonitis: Infectious versus hypersensitivity versus interstitialâ€“CT scan 10/21    #B12 deficiency    #Dupuytren's of left hand      #Right mass over shoulder: Secondary to lipoma likely vs ganglion cyst   -might get removed w/ shoulder surgery    #right shoulder pain:  -followed by [facility]     *next visit will be wellness/preventative exam    Health Maintenance Reminder:  -Blood Work: today  -PSA: today   -Hep C: neg  -Colonoscopy: 2025  -Lung Cancer: wants to wait until 2024 not covered   -AAA: 65-79 smoker.   -shingles: completed  -Pneumovax: 65y   -Flu: Yearly

## 2024-08-10 LAB — PSA SERPL-MCNC: 1.79 NG/ML

## 2024-08-30 DIAGNOSIS — E11.9 TYPE 2 DIABETES MELLITUS WITHOUT COMPLICATIONS (MULTI): ICD-10-CM

## 2024-08-30 RX ORDER — METFORMIN HYDROCHLORIDE 1000 MG/1
TABLET ORAL
Qty: 180 TABLET | Refills: 1 | Status: SHIPPED | OUTPATIENT
Start: 2024-08-30

## 2024-09-13 DIAGNOSIS — E78.2 MIXED HYPERLIPIDEMIA: ICD-10-CM

## 2024-09-13 DIAGNOSIS — E11.9 TYPE 2 DIABETES MELLITUS WITHOUT COMPLICATIONS (MULTI): ICD-10-CM

## 2024-09-13 RX ORDER — ASPIRIN 81 MG/1
81 TABLET ORAL DAILY
Qty: 90 TABLET | Refills: 3 | Status: SHIPPED | OUTPATIENT
Start: 2024-09-13

## 2024-09-20 ENCOUNTER — APPOINTMENT (OUTPATIENT)
Dept: PRIMARY CARE | Facility: CLINIC | Age: 64
End: 2024-09-20
Payer: COMMERCIAL

## 2024-09-20 VITALS
HEART RATE: 64 BPM | DIASTOLIC BLOOD PRESSURE: 78 MMHG | OXYGEN SATURATION: 98 % | WEIGHT: 188.8 LBS | BODY MASS INDEX: 27.88 KG/M2 | SYSTOLIC BLOOD PRESSURE: 122 MMHG

## 2024-09-20 DIAGNOSIS — E11.42 DIABETIC POLYNEUROPATHY ASSOCIATED WITH TYPE 2 DIABETES MELLITUS (MULTI): ICD-10-CM

## 2024-09-20 DIAGNOSIS — Z00.00 ROUTINE GENERAL MEDICAL EXAMINATION AT A HEALTH CARE FACILITY: Primary | ICD-10-CM

## 2024-09-20 DIAGNOSIS — J43.2 CENTRILOBULAR EMPHYSEMA (MULTI): ICD-10-CM

## 2024-09-20 DIAGNOSIS — E11.9 TYPE 2 DIABETES MELLITUS WITHOUT COMPLICATION, WITHOUT LONG-TERM CURRENT USE OF INSULIN (MULTI): ICD-10-CM

## 2024-09-20 LAB — POC FINGERSTICK BLOOD GLUCOSE: 112 MG/DL (ref 70–100)

## 2024-09-20 PROCEDURE — 82962 GLUCOSE BLOOD TEST: CPT | Performed by: FAMILY MEDICINE

## 2024-09-20 PROCEDURE — 99396 PREV VISIT EST AGE 40-64: CPT | Performed by: FAMILY MEDICINE

## 2024-09-20 PROCEDURE — 3078F DIAST BP <80 MM HG: CPT | Performed by: FAMILY MEDICINE

## 2024-09-20 PROCEDURE — 3074F SYST BP LT 130 MM HG: CPT | Performed by: FAMILY MEDICINE

## 2024-09-20 PROCEDURE — 3048F LDL-C <100 MG/DL: CPT | Performed by: FAMILY MEDICINE

## 2024-09-20 ASSESSMENT — PATIENT HEALTH QUESTIONNAIRE - PHQ9
SUM OF ALL RESPONSES TO PHQ9 QUESTIONS 1 AND 2: 0
2. FEELING DOWN, DEPRESSED OR HOPELESS: NOT AT ALL
1. LITTLE INTEREST OR PLEASURE IN DOING THINGS: NOT AT ALL

## 2024-09-20 ASSESSMENT — ENCOUNTER SYMPTOMS
JOINT SWELLING: 0
FEVER: 0
SEIZURES: 0
CONSTIPATION: 0
DIFFICULTY URINATING: 0
NERVOUS/ANXIOUS: 0
TROUBLE SWALLOWING: 0
DIARRHEA: 0
CONFUSION: 0
PALPITATIONS: 0
APPETITE CHANGE: 0
UNEXPECTED WEIGHT CHANGE: 0
HEMATURIA: 0
COLOR CHANGE: 0
SHORTNESS OF BREATH: 0
BLOOD IN STOOL: 0

## 2024-09-20 ASSESSMENT — PROMIS GLOBAL HEALTH SCALE
RATE_AVERAGE_FATIGUE: MODERATE
RATE_GENERAL_HEALTH: VERY GOOD
CARRYOUT_SOCIAL_ACTIVITIES: VERY GOOD
RATE_AVERAGE_PAIN: 0
CARRYOUT_PHYSICAL_ACTIVITIES: COMPLETELY
RATE_SOCIAL_SATISFACTION: VERY GOOD
EMOTIONAL_PROBLEMS: RARELY
RATE_PHYSICAL_HEALTH: VERY GOOD
RATE_MENTAL_HEALTH: EXCELLENT
RATE_QUALITY_OF_LIFE: VERY GOOD

## 2024-09-20 NOTE — PROGRESS NOTES
"Subjective   Patient ID: Alexander Corcoran \"Lul\" is a 64 y.o. male who presents for Annual Exam.  HPI  Pleasant 64-year-old  male presents today for follow-up.  He was recently seen in the office and labs were done.  Reviewed over his history today.  No new complaints    Review of Systems   Constitutional:  Negative for appetite change, fever and unexpected weight change.   HENT:  Negative for congestion and trouble swallowing.    Eyes:  Negative for visual disturbance.   Respiratory:  Negative for shortness of breath.    Cardiovascular:  Negative for chest pain, palpitations and leg swelling.   Gastrointestinal:  Negative for blood in stool, constipation and diarrhea.   Genitourinary:  Negative for difficulty urinating and hematuria.   Musculoskeletal:  Negative for gait problem and joint swelling.   Skin:  Negative for color change.   Allergic/Immunologic: Negative for immunocompromised state.   Neurological:  Negative for seizures and syncope.   Psychiatric/Behavioral:  Negative for confusion and suicidal ideas. The patient is not nervous/anxious.        Objective   /78   Pulse 64   Wt 85.6 kg (188 lb 12.8 oz)   SpO2 98%   BMI 27.88 kg/m²     Physical Exam  Constitutional:       General: He is not in acute distress.     Appearance: Normal appearance. He is not ill-appearing.   HENT:      Head: Normocephalic and atraumatic.      Right Ear: Tympanic membrane normal.      Left Ear: Tympanic membrane normal.      Nose: Nose normal.      Mouth/Throat:      Mouth: Mucous membranes are moist.   Eyes:      Pupils: Pupils are equal, round, and reactive to light.   Cardiovascular:      Rate and Rhythm: Normal rate and regular rhythm.      Heart sounds: No murmur heard.     No friction rub. No gallop.   Pulmonary:      Effort: Pulmonary effort is normal.      Breath sounds: Normal breath sounds.   Abdominal:      General: Abdomen is flat. There is no distension.      Palpations: Abdomen is soft.      " Tenderness: There is no abdominal tenderness. There is no guarding.      Hernia: No hernia is present.   Musculoskeletal:         General: Normal range of motion.   Skin:     General: Skin is warm and dry.   Neurological:      General: No focal deficit present.      Mental Status: He is alert. Mental status is at baseline.      Cranial Nerves: No cranial nerve deficit.      Motor: No weakness.      Gait: Gait normal.   Psychiatric:         Mood and Affect: Mood normal.         Behavior: Behavior normal.         Thought Content: Thought content normal.         Judgment: Judgment normal.         Assessment/Plan   Problem List Items Addressed This Visit       Diabetes mellitus, type 2 (Multi) - Primary    Relevant Orders    CT cardiac scoring wo IV contrast    POCT Fingerstick Glucose manually resulted (Completed)     Assessment:  Diabetes:   -6.1% (9/24) ,micro: <30(12/23), foot:nml 5/22, eye: [facility]   -Metformin 1000 mg twice daily, ozempic, atorvastatin 40 mg     Diabetic neuropathy     Hyperlipidemia:  -lipitor 40mg      Tobacco use:   -Failed Chantix     Mild SHANICE     Pneumonitis:   -Infectious versus hypersensitivity versus interstitial  -CT scan 10/21     B12 deficiency     Dupuytren's of left hand      Right mass over shoulder:   -Secondary to lipoma likely vs ganglion cyst      right shoulder pain:  -followed by [facility] Reminder:  -Blood Work: 8/25  -PSA: 8/25   -Hep C: neg  -Colonoscopy: 2025  -Lung Cancer: wants to wait until 2024 not covered   -AAA: 65-79 smoker.   -shingles: completed  -Pneumovax: 65y   -Flu: pharm

## 2025-01-10 ENCOUNTER — HOSPITAL ENCOUNTER (OUTPATIENT)
Dept: RADIOLOGY | Facility: HOSPITAL | Age: 65
Discharge: HOME | End: 2025-01-10
Payer: COMMERCIAL

## 2025-01-10 DIAGNOSIS — E11.9 TYPE 2 DIABETES MELLITUS WITHOUT COMPLICATION, WITHOUT LONG-TERM CURRENT USE OF INSULIN (MULTI): ICD-10-CM

## 2025-01-10 PROCEDURE — 75571 CT HRT W/O DYE W/CA TEST: CPT

## 2025-01-31 ENCOUNTER — APPOINTMENT (OUTPATIENT)
Dept: PRIMARY CARE | Facility: CLINIC | Age: 65
End: 2025-01-31
Payer: COMMERCIAL

## 2025-01-31 VITALS
HEART RATE: 64 BPM | OXYGEN SATURATION: 97 % | SYSTOLIC BLOOD PRESSURE: 116 MMHG | DIASTOLIC BLOOD PRESSURE: 74 MMHG | WEIGHT: 191.38 LBS | BODY MASS INDEX: 28.26 KG/M2

## 2025-01-31 DIAGNOSIS — Z12.11 SCREENING FOR COLON CANCER: Primary | ICD-10-CM

## 2025-01-31 DIAGNOSIS — E11.9 TYPE 2 DIABETES MELLITUS WITHOUT COMPLICATION, WITHOUT LONG-TERM CURRENT USE OF INSULIN (MULTI): ICD-10-CM

## 2025-01-31 DIAGNOSIS — E11.9 TYPE 2 DIABETES MELLITUS WITHOUT COMPLICATIONS (MULTI): ICD-10-CM

## 2025-01-31 DIAGNOSIS — E11.42 DIABETIC POLYNEUROPATHY ASSOCIATED WITH TYPE 2 DIABETES MELLITUS (MULTI): ICD-10-CM

## 2025-01-31 LAB — POC HEMOGLOBIN A1C: 6.4 % (ref 4.2–6.5)

## 2025-01-31 PROCEDURE — 99213 OFFICE O/P EST LOW 20 MIN: CPT | Performed by: FAMILY MEDICINE

## 2025-01-31 PROCEDURE — 3078F DIAST BP <80 MM HG: CPT | Performed by: FAMILY MEDICINE

## 2025-01-31 PROCEDURE — 83036 HEMOGLOBIN GLYCOSYLATED A1C: CPT | Performed by: FAMILY MEDICINE

## 2025-01-31 PROCEDURE — 3074F SYST BP LT 130 MM HG: CPT | Performed by: FAMILY MEDICINE

## 2025-01-31 RX ORDER — METFORMIN HYDROCHLORIDE 1000 MG/1
1000 TABLET ORAL EVERY 12 HOURS
Qty: 180 TABLET | Refills: 1 | Status: SHIPPED | OUTPATIENT
Start: 2025-01-31

## 2025-01-31 ASSESSMENT — ENCOUNTER SYMPTOMS
DIFFICULTY URINATING: 0
SEIZURES: 0
DIARRHEA: 0
JOINT SWELLING: 0
BLOOD IN STOOL: 0
APPETITE CHANGE: 0
CONSTIPATION: 0
TROUBLE SWALLOWING: 0
HEMATURIA: 0
PALPITATIONS: 0
CONFUSION: 0
FEVER: 0
SHORTNESS OF BREATH: 0
NERVOUS/ANXIOUS: 0
UNEXPECTED WEIGHT CHANGE: 0
COLOR CHANGE: 0

## 2025-01-31 NOTE — PROGRESS NOTES
"Subjective   Patient ID: Alexander Corcoran \"Lul\" is a 64 y.o. male who presents for Diabetes (3 month follow up ).  HPI  Pleasant 64-year-old  male presents today for follow-up.  DM:  -controlled   -needs micro    Patient's been seen his medicine as prescribed  Patient's blood pressure is well-controlled  We discussed again quitting options for smoking  Review of Systems   Constitutional:  Negative for appetite change, fever and unexpected weight change.   HENT:  Negative for congestion and trouble swallowing.    Eyes:  Negative for visual disturbance.   Respiratory:  Negative for shortness of breath.    Cardiovascular:  Negative for chest pain, palpitations and leg swelling.   Gastrointestinal:  Negative for blood in stool, constipation and diarrhea.   Genitourinary:  Negative for difficulty urinating and hematuria.   Musculoskeletal:  Negative for gait problem and joint swelling.   Skin:  Negative for color change.   Allergic/Immunologic: Negative for immunocompromised state.   Neurological:  Negative for seizures and syncope.   Psychiatric/Behavioral:  Negative for confusion and suicidal ideas. The patient is not nervous/anxious.        Objective   /74   Pulse 64   Wt 86.8 kg (191 lb 6 oz)   SpO2 97%   BMI 28.26 kg/m²     Physical Exam  Constitutional:       General: He is not in acute distress.     Appearance: Normal appearance. He is not ill-appearing.   HENT:      Head: Normocephalic and atraumatic.      Right Ear: Tympanic membrane normal.      Left Ear: Tympanic membrane normal.      Nose: Nose normal.      Mouth/Throat:      Mouth: Mucous membranes are moist.   Eyes:      Pupils: Pupils are equal, round, and reactive to light.   Cardiovascular:      Rate and Rhythm: Normal rate and regular rhythm.      Heart sounds: No murmur heard.     No friction rub. No gallop.   Pulmonary:      Effort: Pulmonary effort is normal.      Breath sounds: Normal breath sounds.   Abdominal:      General: " Abdomen is flat. There is no distension.      Palpations: Abdomen is soft.      Tenderness: There is no abdominal tenderness. There is no guarding.      Hernia: No hernia is present.   Musculoskeletal:         General: Normal range of motion.   Skin:     General: Skin is warm and dry.   Neurological:      General: No focal deficit present.      Mental Status: He is alert. Mental status is at baseline.      Cranial Nerves: No cranial nerve deficit.      Motor: No weakness.      Gait: Gait normal.   Psychiatric:         Mood and Affect: Mood normal.         Behavior: Behavior normal.         Thought Content: Thought content normal.         Judgment: Judgment normal.         Assessment/Plan   Problem List Items Addressed This Visit       Diabetes mellitus, type 2 (Multi)    Diabetic neuropathy (Multi)    Relevant Orders    POCT glycosylated hemoglobin (Hb A1C) manually resulted (Completed)    Albumin-Creatinine Ratio, Urine Random (Completed)     Other Visit Diagnoses       Screening for colon cancer    -  Primary    Relevant Orders    Colonoscopy Screening; Average Risk Patient    Type 2 diabetes mellitus without complications (Multi)        Relevant Medications    metFORMIN (Glucophage) 1,000 mg tablet          Assessment:  Diabetes:   -6.4% (1/25) ,micro: Less than 30 (1/25), foot:nml 5/22, eye: [facility]   -Metformin 1000 mg twice daily, ozempic, atorvastatin 40 mg     Diabetic neuropathy     Hyperlipidemia:  -lipitor 40mg      Tobacco use:   -Failed Chantix     Mild SHANICE     Pneumonitis:   -Infectious versus hypersensitivity versus interstitial  -CT scan 10/21     B12 deficiency     Dupuytren's of left hand      Right mass over shoulder:   -Secondary to lipoma likely vs ganglion cyst      right shoulder pain:  -followed by [facility] Maintenance Reminder:  -Blood Work: 8/25  -PSA: 8/25   -Hep C: neg  -Colonoscopy: 2025  -Lung Cancer: wants to wait until 2024 not covered   -AAA: 65-79 smoker.    -shingles: completed  -Pneumovax: 65y   -Flu: pharm

## 2025-02-01 LAB
ALBUMIN/CREAT UR: 6 MG/G CREAT
CREAT UR-MCNC: 124 MG/DL (ref 20–320)
MICROALBUMIN UR-MCNC: 0.8 MG/DL

## 2025-02-06 DIAGNOSIS — E11.9 TYPE 2 DIABETES MELLITUS WITHOUT COMPLICATION, WITHOUT LONG-TERM CURRENT USE OF INSULIN (MULTI): ICD-10-CM

## 2025-02-06 RX ORDER — SEMAGLUTIDE 0.68 MG/ML
INJECTION, SOLUTION SUBCUTANEOUS
Qty: 3 ML | Refills: 8 | Status: SHIPPED | OUTPATIENT
Start: 2025-02-06

## 2025-03-06 ENCOUNTER — ANESTHESIA EVENT (OUTPATIENT)
Dept: OPERATING ROOM | Facility: HOSPITAL | Age: 65
End: 2025-03-06
Payer: COMMERCIAL

## 2025-03-06 ENCOUNTER — TELEPHONE (OUTPATIENT)
Dept: PREADMISSION TESTING | Facility: HOSPITAL | Age: 65
End: 2025-03-06
Payer: COMMERCIAL

## 2025-03-06 RX ORDER — ONDANSETRON HYDROCHLORIDE 2 MG/ML
4 INJECTION, SOLUTION INTRAVENOUS ONCE AS NEEDED
Status: CANCELLED | OUTPATIENT
Start: 2025-03-06

## 2025-03-06 RX ORDER — DROPERIDOL 2.5 MG/ML
0.62 INJECTION, SOLUTION INTRAMUSCULAR; INTRAVENOUS ONCE AS NEEDED
Status: CANCELLED | OUTPATIENT
Start: 2025-03-06

## 2025-03-07 NOTE — TELEPHONE ENCOUNTER
Instructed not to take ozempic, or metformin Sunday night or Monday am. Pt to hold asa as of now.

## 2025-03-07 NOTE — TELEPHONE ENCOUNTER
SURGERY PRE-OPERATIVE INSTRUCTIONS    *You will receive a phone call the day before your procedure  after 2pm, (or the Friday before your surgery if scheduled on a Monday.) Generally the hospital will be calling you with this information after that time.    *You are not to eat after midnight the night before the surgery. You may have up to 13 ounces of clear liquids up until 2 hours prior to arriving to the hospital. The exception is with medications you were instructed to take day of surgery.    *You may take tylenol for pain/discomfort as needed.     *Stop taking all aspirin products, ibuprofen (motrin/advil), naproxen (aleve/naprosyn) for one week prior to surgery.    *Stop taking all vitamins and supplements one week prior to surgery.     *You should not have alcoholic beverages for 24 hours before surgery.     *You should not smoke 24 hours prior to surgery.     *To help prevent surgical infections bathe/shower with Dial soap the evening before surgery.    *You can wear deodorant but no lotion, powder, or perfume/cologne. You should remove all make-up and nail polish at home.    *If you wear glasses, please bring a case for the glasses with you.    *You will be asked to remove dentures and contacts.     *Please leave all valuables at home.    *You should wear loose, comfortable clothing that will accommodate bandages and/or casts.    *You should notify your doctor of any change in your condition (fever, cold, rash, etc). Surgery may need to be re-scheduled until a time you are in better health.    *A responsible adult is required to accompany you to and from the hospital if you are receiving anesthesia or a sedative. Patients are not permitted to drive for 24 hours after anesthesia.     *You can use the [place] parking if you wish.     *If you have any further questions please call [facility] [phone].

## 2025-03-10 ENCOUNTER — HOSPITAL ENCOUNTER (OUTPATIENT)
Dept: OPERATING ROOM | Facility: HOSPITAL | Age: 65
Setting detail: OUTPATIENT SURGERY
Discharge: HOME | End: 2025-03-10
Payer: COMMERCIAL

## 2025-03-10 ENCOUNTER — TELEPHONE (OUTPATIENT)
Dept: PRIMARY CARE | Facility: CLINIC | Age: 65
End: 2025-03-10

## 2025-03-10 ENCOUNTER — ANESTHESIA (OUTPATIENT)
Dept: OPERATING ROOM | Facility: HOSPITAL | Age: 65
End: 2025-03-10
Payer: COMMERCIAL

## 2025-03-10 VITALS
TEMPERATURE: 96.8 F | SYSTOLIC BLOOD PRESSURE: 120 MMHG | WEIGHT: 196.21 LBS | BODY MASS INDEX: 29.06 KG/M2 | DIASTOLIC BLOOD PRESSURE: 80 MMHG | RESPIRATION RATE: 18 BRPM | OXYGEN SATURATION: 97 % | HEIGHT: 69 IN | HEART RATE: 78 BPM

## 2025-03-10 DIAGNOSIS — Z12.11 SCREENING FOR COLON CANCER: ICD-10-CM

## 2025-03-10 LAB — GLUCOSE BLD MANUAL STRIP-MCNC: 130 MG/DL (ref 74–99)

## 2025-03-10 PROCEDURE — 2500000004 HC RX 250 GENERAL PHARMACY W/ HCPCS (ALT 636 FOR OP/ED): Performed by: NURSE ANESTHETIST, CERTIFIED REGISTERED

## 2025-03-10 PROCEDURE — A45385 PR COLONOSCOPY,REMV LESN,SNARE: Performed by: ANESTHESIOLOGY

## 2025-03-10 PROCEDURE — 3600000007 HC OR TIME - EACH INCREMENTAL 1 MINUTE - PROCEDURE LEVEL TWO: Performed by: ANESTHESIOLOGY

## 2025-03-10 PROCEDURE — 3600000002 HC OR TIME - INITIAL BASE CHARGE - PROCEDURE LEVEL TWO: Performed by: ANESTHESIOLOGY

## 2025-03-10 PROCEDURE — 7100000010 HC PHASE TWO TIME - EACH INCREMENTAL 1 MINUTE: Performed by: ANESTHESIOLOGY

## 2025-03-10 PROCEDURE — 82947 ASSAY GLUCOSE BLOOD QUANT: CPT

## 2025-03-10 PROCEDURE — 2500000004 HC RX 250 GENERAL PHARMACY W/ HCPCS (ALT 636 FOR OP/ED): Performed by: ANESTHESIOLOGY

## 2025-03-10 PROCEDURE — 7100000009 HC PHASE TWO TIME - INITIAL BASE CHARGE: Performed by: ANESTHESIOLOGY

## 2025-03-10 PROCEDURE — 3700000002 HC GENERAL ANESTHESIA TIME - EACH INCREMENTAL 1 MINUTE: Performed by: ANESTHESIOLOGY

## 2025-03-10 PROCEDURE — 2720000007 HC OR 272 NO HCPCS: Performed by: ANESTHESIOLOGY

## 2025-03-10 PROCEDURE — A45385 PR COLONOSCOPY,REMV LESN,SNARE: Performed by: NURSE ANESTHETIST, CERTIFIED REGISTERED

## 2025-03-10 PROCEDURE — 45381 COLONOSCOPY SUBMUCOUS NJX: CPT | Performed by: SURGERY

## 2025-03-10 PROCEDURE — 3700000001 HC GENERAL ANESTHESIA TIME - INITIAL BASE CHARGE: Performed by: ANESTHESIOLOGY

## 2025-03-10 PROCEDURE — 45385 COLONOSCOPY W/LESION REMOVAL: CPT | Performed by: SURGERY

## 2025-03-10 RX ORDER — SODIUM CHLORIDE, SODIUM LACTATE, POTASSIUM CHLORIDE, CALCIUM CHLORIDE 600; 310; 30; 20 MG/100ML; MG/100ML; MG/100ML; MG/100ML
20 INJECTION, SOLUTION INTRAVENOUS CONTINUOUS
Status: DISCONTINUED | OUTPATIENT
Start: 2025-03-10 | End: 2025-03-11 | Stop reason: HOSPADM

## 2025-03-10 RX ORDER — LIDOCAINE HYDROCHLORIDE 10 MG/ML
INJECTION, SOLUTION EPIDURAL; INFILTRATION; INTRACAUDAL; PERINEURAL AS NEEDED
Status: DISCONTINUED | OUTPATIENT
Start: 2025-03-10 | End: 2025-03-10

## 2025-03-10 RX ORDER — MIDAZOLAM HYDROCHLORIDE 1 MG/ML
INJECTION INTRAMUSCULAR; INTRAVENOUS AS NEEDED
Status: DISCONTINUED | OUTPATIENT
Start: 2025-03-10 | End: 2025-03-10

## 2025-03-10 RX ORDER — PROPOFOL 10 MG/ML
INJECTION, EMULSION INTRAVENOUS AS NEEDED
Status: DISCONTINUED | OUTPATIENT
Start: 2025-03-10 | End: 2025-03-10

## 2025-03-10 RX ADMIN — PROPOFOL 62.4 MG: 10 INJECTION, EMULSION INTRAVENOUS at 08:53

## 2025-03-10 RX ADMIN — PROPOFOL 160 MCG/KG/MIN: 10 INJECTION, EMULSION INTRAVENOUS at 08:54

## 2025-03-10 RX ADMIN — LIDOCAINE HYDROCHLORIDE 20 MG: 10 SOLUTION INTRAVENOUS at 08:53

## 2025-03-10 RX ADMIN — MIDAZOLAM HYDROCHLORIDE 2 MG: 1 INJECTION, SOLUTION INTRAMUSCULAR; INTRAVENOUS at 08:43

## 2025-03-10 RX ADMIN — SODIUM CHLORIDE, POTASSIUM CHLORIDE, SODIUM LACTATE AND CALCIUM CHLORIDE 20 ML/HR: 600; 310; 30; 20 INJECTION, SOLUTION INTRAVENOUS at 08:16

## 2025-03-10 SDOH — HEALTH STABILITY: MENTAL HEALTH: CURRENT SMOKER: 1

## 2025-03-10 ASSESSMENT — PAIN SCALES - GENERAL
PAINLEVEL_OUTOF10: 0 - NO PAIN
PAINLEVEL_OUTOF10: 0 - NO PAIN
PAIN_LEVEL: 2

## 2025-03-10 ASSESSMENT — PAIN - FUNCTIONAL ASSESSMENT: PAIN_FUNCTIONAL_ASSESSMENT: 0-10

## 2025-03-10 NOTE — DISCHARGE INSTRUCTIONS
Patient Instructions after a Colonoscopy      The anesthetics, sedatives or narcotics which were given to you today will be acting in your body for the next 24 hours, so you might feel a little sleepy or groggy.  This feeling should slowly wear off. Carefully read and follow the instructions.     You received sedation today:  - Do not drive or operate any machinery or power tools of any kind.   - No alcoholic beverages today, not even beer or wine.  - Do not make any important decisions or sign any legal documents.  - No over the counter medications that contain alcohol or that may cause drowsiness.  - Do not make any important decisions or sign any legal documents.  - Make sure you have someone with you for first 24 hours.    While it is common to experience mild to moderate abdominal distention, gas, or belching after your procedure, if any of these symptoms occur following discharge from the GI Lab or within one week of having your procedure, call the [facility] to be advised whether a visit to your nearest Urgent Care or Emergency Department is indicated.  Take this paper with you if you go.     - If you develop an allergic reaction to the medications that were given during your procedure such as difficulty breathing, rash, hives, severe nausea, vomiting or lightheadedness.  - If you experience chest pain, shortness of breath, severe abdominal pain, fevers and chills.  -If you develop signs and symptoms of bleeding such as blood in your spit, if your stools turn black, tarry, or bloody  - If you have not urinated within 8 hours following your procedure.  - If your IV site becomes painful, red, inflamed, or looks infected.    If you received a biopsy/polypectomy/sphincterotomy the following instructions apply below:    __ Do not use Aspirin containing products, non-steroidal medications or anti-coagulants for one week following your procedure. (Examples of these types of medications are: Advil,  Arthrotec, Aleve, Coumadin, Ecotrin, Heparin, Ibuprofen, Indocin, Motrin, Naprosyn, Nuprin, Plavix, Vioxx, and Voltarin, or their generic forms.  This list is not all-inclusive.  Check with your physician or pharmacist before resuming medications.)   __ Eat a soft diet today.  Avoid foods that are poorly digested for the next 24 hours.  These foods would include: nuts, beans, lettuce, red meats, and fried foods. Start with liquids and advance your diet as tolerated, gradually work up to eating solids.   __ Do not have a Barium Study or Enema for one week.    Your physician recommends the additional following instructions:    -You have a contact number available for emergencies. The signs and symptoms of potential delayed complications were discussed with you. You may return to normal activities tomorrow.  -Resume your previous diet.  -Continue your present medications.   -We are waiting for your pathology results.  -Your physician has recommended a repeat colonoscopy (date to be determined after pending pathology results are reviewed) for surveillance based on pathology results.  -The findings and recommendations have been discussed with you.  -The findings and recommendations were discussed with your family.  - Please see Medication Reconciliation Form for new medication/medications prescribed.       If you experience any problems or have any questions following discharge from the GI Lab, please call:        Nurse Signature                                                                        Date___________________                                                                            Patient/Responsible Party Signature                                        Date___________________

## 2025-03-10 NOTE — H&P
General Surgery History and Physical    Referring Provider:  RICHARD Hagen Dominic V, DO     Chief Complaint:  Colonoscopy    History of Present Illness:  This is a 64 y.o. male who presents for a colonoscopy.    Past Medical History:  Past Medical History:   Diagnosis Date    DM (diabetes mellitus), type 2     Encounter for immunization 01/19/2015    Need for Tdap vaccination    Encounter for screening for cardiovascular disorders 01/19/2015    Encounter for screening for cardiovascular disorders    Encounter for screening for diabetes mellitus 01/19/2015    Encounter for screening for diabetes mellitus    Encounter for screening for lipoid disorders 01/19/2015    Encounter for screening for lipoid disorders    Encounter for screening for malignant neoplasm of prostate 05/12/2015    Encounter for screening for malignant neoplasm of prostate    Encounter for screening for malignant neoplasm of prostate 11/13/2017    Prostate cancer screening    HLD (hyperlipidemia)     Lyme disease, unspecified     Erythema migrans (Lyme disease)    Neuropathy     SHANICE (obstructive sleep apnea)     mild    Other hemorrhoids 02/16/2015    Internal hemorrhoids    Pain in unspecified joint 11/13/2017    Multiple joint pain    Personal history of other diseases of the respiratory system 02/09/2016    History of influenza    Personal history of other endocrine, nutritional and metabolic disease 05/12/2015    History of hyperglycemia    Personal history of other endocrine, nutritional and metabolic disease 11/13/2017    History of hyperglycemia    Personal history of other infectious and parasitic diseases 12/02/2016    History of herpes zoster    Personal history of other specified conditions 11/13/2017    History of hemoptysis    Unspecified otitis externa, unspecified ear 11/02/2018    Otitis externa        Past Surgical History:  Past Surgical History:   Procedure Laterality Date    COLONOSCOPY  02/16/2015    Complete  "Colonoscopy    OTHER SURGICAL HISTORY  09/29/2021    Foot surgery    VASECTOMY  01/19/2015    Surgery Vas Deferens Vasectomy        Medications:  Current Outpatient Medications   Medication Instructions    aspirin 81 mg, oral, Daily, as directed    atorvastatin (LIPITOR) 40 mg, oral, Daily    metFORMIN (GLUCOPHAGE) 1,000 mg, oral, Every 12 hours    semaglutide (Ozempic) 0.25 mg or 0.5 mg (2 mg/3 mL) pen injector INJECT 0.5MG UNDER THE SKIN EVERY 7 DAYS        Allergies:  No Known Allergies     Family History:  No family history on file.     Social History:  Social History     Socioeconomic History    Marital status:    Tobacco Use    Smoking status: Every Day     Current packs/day: 1.00     Types: Cigarettes    Smokeless tobacco: Never   Vaping Use    Vaping status: Never Used   Substance and Sexual Activity    Alcohol use: Never    Drug use: Never    Sexual activity: Defer        Review of Systems:  A complete 12 point review of systems was performed and is negative except as noted in the history of present illness.      Vital Signs:  Vitals:    03/10/25 0751   BP: 130/77   Pulse: 86   Resp: 16   Temp: 36.6 °C (97.9 °F)   SpO2: 97%          Physical Exam:  General: No acute distress. Sitting up in bed.   Neuro: Alert and oriented ×3. Follows commands.  Head: Atraumatic  Eyes: Pupils equal reactive to light. Extraocular motions intact.  Ears: Hears normal speaking voice.  Mouth, Nose, Throat: Mucous membranes moist.  Normal dentition.  Neck: Supple. No appreciable masses.  Chest: No crepitus.    Heart: Regular rate and rhythm.  Lung: Clear to auscultation bilaterally.  Vascular: No carotid bruits.  Palpable radial pulses bilaterally.  Abdomen: Soft. Nondistended. Nontender.    Musculoskeletal: Moves all extremities.  Normal range of motion.  Lymphatic: No palpable lymph nodes.  Skin: No rashes or lesions.  Psychological: Normal affect      Laboratory Values:  CBC: No results found for: \"WBC\", \"RBC\", \"HGB\", " "\"HCT\", \"PLT\"    RFP: No results found for: \"GLUF\", \"NA\", \"K\", \"CL\", \"CO2\", \"BUN\", \"CREATININE\", \"CALCIUM\", \"MG\", \"PHOS\"     LFTs: No results found for: \"PROT\", \"ALBUMIN\", \"BILITOT\", \"BILIDIR\", \"ALKPHOS\", \"AST\", \"ALT\"         Assessment:  This is a 64 y.o. male who presents for a colonoscopy.      Plan:  -- Colonoscopy.      Gallito Corley MD  General Surgery  Office: [phone]  Fax:     [phone]  8:39 AM   03/10/25     "

## 2025-03-10 NOTE — TELEPHONE ENCOUNTER
PT stated he had his colonoscopy today and they found a mass and took a biopsy for cancer and he has to come back in 2 wks and do another colonoscopy next year.  He [facility]

## 2025-03-10 NOTE — ANESTHESIA PREPROCEDURE EVALUATION
"Patient: Alexander Corcoran \"Bill\"    Procedure Information       Anesthesia Start Date/Time: 03/10/25 0843    Scheduled providers: Slim Corley MD; Miguel A Fonseca MD    Procedure: COLONOSCOPY    Location: [facility] OR            Relevant Problems   Cardiac   (+) Hyperlipidemia      Pulmonary   (+) COPD (chronic obstructive pulmonary disease) (Multi)      Endocrine   (+) Diabetes mellitus, type 2 (Multi)   (+) Diabetic neuropathy (Multi)       Clinical information reviewed:   Tobacco  Allergies  Meds   Med Hx  Surg Hx   Fam Hx  Soc Hx        NPO Detail:  NPO/Void Status  Carbohydrate Drink Given Prior to Surgery? : N  Date of Last Liquid: 03/09/25  Time of Last Liquid: 2300  Date of Last Solid: 03/08/25  Time of Last Solid: 2300  Last Intake Type: Clear fluids  Time of Last Void: 0730         Physical Exam    Airway  Mallampati: II  TM distance: >3 FB  Neck ROM: full     Cardiovascular - normal exam  Rhythm: regular  Rate: normal     Dental        Pulmonary - normal exam     Abdominal - normal exam             Anesthesia Plan    History of general anesthesia?: yes  History of complications of general anesthesia?: no    ASA 3     MAC     The patient is a current smoker.  Patient was previously instructed to abstain from smoking on day of procedure.  Patient smoked on day of procedure.    intravenous induction   Anesthetic plan and risks discussed with patient.  Use of blood products discussed with patient who consented to blood products.    Plan discussed with attending.      "

## 2025-03-10 NOTE — ANESTHESIA POSTPROCEDURE EVALUATION
"Patient: Alexander Corcoran \"Bill\"    Procedure Summary       Date: 03/10/25 Room / Location: [facility] OR    Anesthesia Start: 0843 Anesthesia Stop: 0946    Procedure: COLONOSCOPY Diagnosis: Screening for colon cancer    Scheduled Providers: Slim Corley MD; Miguel A Fonseca MD Responsible Provider: Miguel A Fonseca MD    Anesthesia Type: MAC ASA Status: 3            Anesthesia Type: MAC    Vitals Value Taken Time   /67 03/10/25 0943   Temp 36 °C (96.8 °F) 03/10/25 0943   Pulse 70 03/10/25 0943   Resp 12 03/10/25 0943   SpO2 98 % 03/10/25 0943       Anesthesia Post Evaluation    Patient location during evaluation: PACU  Patient participation: complete - patient participated  Level of consciousness: awake  Pain score: 2  Pain management: adequate  Multimodal analgesia pain management approach  Airway patency: patent  Two or more strategies used to mitigate risk of obstructive sleep apnea  Cardiovascular status: acceptable  Respiratory status: acceptable  Hydration status: acceptable  Postoperative Nausea and Vomiting: none    No notable events documented.    "

## 2025-03-18 LAB
LABORATORY COMMENT REPORT: NORMAL
PATH REPORT.ADDENDUM SPEC: NORMAL
PATH REPORT.FINAL DX SPEC: NORMAL
PATH REPORT.GROSS SPEC: NORMAL
PATH REPORT.RELEVANT HX SPEC: NORMAL
PATH REPORT.TOTAL CANCER: NORMAL

## 2025-04-02 ENCOUNTER — APPOINTMENT (OUTPATIENT)
Dept: SURGERY | Facility: CLINIC | Age: 65
End: 2025-04-02
Payer: COMMERCIAL

## 2025-04-02 DIAGNOSIS — K63.5 POLYP OF COLON, UNSPECIFIED PART OF COLON, UNSPECIFIED TYPE: Primary | ICD-10-CM

## 2025-04-02 PROCEDURE — 99214 OFFICE O/P EST MOD 30 MIN: CPT | Performed by: SURGERY

## 2025-04-02 NOTE — PROGRESS NOTES
General Surgery History and Physical    Referring Provider:  Lee Ann Connors PA-C      Chief Complaint:  Sigmoid polyp    History of Present Illness:  This is a 64 y.o. male who presents with a large sigmoid polyp seen on colonoscopy.  He underwent a 10-year follow-up colonoscopy last month, and had a massive sigmoid polyp that appeared to be colon cancer.  Fortunately, it is turned up to be an adenoma, without any evidence of cancer.  The entire polyp was not removed, but large chunks of it were, ensuring that there was plenty of specimen for pathology.  He denies any issues since the procedure.    Past Medical History:  Diabetes  Neuropathy  Obstructive sleep apnea  Hyperlipidemia    Past Surgical History:  Colonoscopy  Foot surgery  Vasectomy    Medications:  Aspirin  Atorvastatin  Metformin  Ozempic    Allergies:  No known allergies    Family History:  Patient denies any known family history    Social History:  .  Works as a .  Smokes a pack a day.  Denies alcohol and illicits.       Review of Systems:  A complete 12 point review of systems was performed and is negative except as noted in the history of present illness.      Vital Signs:  There were no vitals filed for this visit.       Physical Exam:  General: No acute distress. Sitting up in bed.   Neuro: Alert and oriented ×3. Follows commands.  Head: Atraumatic  Eyes: Pupils equal reactive to light. Extraocular motions intact.  Ears: Hears normal speaking voice.  Mouth, Nose, Throat: Mucous membranes moist.  Missing a few teeth.  Neck: Supple. No appreciable masses.  Chest: No crepitus.  No appreciable scars.  Heart: Regular rate and rhythm.  Lung: Clear to auscultation bilaterally.  Vascular: No carotid bruits.  Palpable radial pulses bilaterally.  Abdomen: Soft. Nondistended. Nontender.  No appreciable hernias or scars.  Musculoskeletal: Moves all extremities.  Normal range of motion.  Lymphatic: No palpable lymph nodes.  Skin: No rashes or  lesions.  Psychological: Normal affect      Laboratory Values:  CBC:   Lab Results   Component Value Date    WBC 10.1 08/09/2024    RBC 5.40 08/09/2024    HGB 15.9 08/09/2024    HCT 50.5 08/09/2024     08/09/2024       RFP:   Lab Results   Component Value Date     08/09/2024    K 4.9 08/09/2024     08/09/2024    CO2 25 08/09/2024    BUN 15 08/09/2024    CREATININE 0.68 08/09/2024    CALCIUM 9.5 08/09/2024        LFTs:   Lab Results   Component Value Date    PROT 6.3 (L) 08/09/2024    ALBUMIN 3.8 08/09/2024    BILITOT 0.6 08/09/2024    ALKPHOS 56 08/09/2024    AST 20 08/09/2024    ALT 17 08/09/2024      Pathology: Both masses are adenomas.      Imaging:  I have personally reviewed the images and the radiologist's report.  Colonoscopy: Small adenoma in the cecum.  6 cm mass in the sigmoid colon.      Assessment:  This is a 64 y.o. male who presents with a very large polyp in the sigmoid colon that pathology has returned as being an adenoma.  We discussed that there is still potential for endoscopic resection, and I therefore recommend seeing advanced endoscopy to attempted.  We discussed that if it is not resectable with advanced endoscopy, or if pathology at that time does show some cancer, we would recommend a laparoscopic sigmoid colectomy.  However, it is definitely worth attempting endoscopic removal prior to scheduling surgery.      Plan:  -- Referral to advanced endoscopy  -- Follow-up after      Gallito Corley MD  General Surgery  Office: [phone]  Fax:     [phone]  3:27 PM   04/02/25

## 2025-04-03 ENCOUNTER — PATIENT MESSAGE (OUTPATIENT)
Dept: SURGERY | Facility: CLINIC | Age: 65
End: 2025-04-03
Payer: COMMERCIAL

## 2025-04-08 ENCOUNTER — TELEPHONE (OUTPATIENT)
Dept: GASTROENTEROLOGY | Facility: HOSPITAL | Age: 65
End: 2025-04-08
Payer: COMMERCIAL

## 2025-04-08 NOTE — TELEPHONE ENCOUNTER
Dr. Loc Zamora's office received a referral for this patient from Dr. Slim Corley regarding a large colon polyp needing removal. This patient is currently scheduled for a clinic consultation with Dr. Zamora on 9/16/2025 but prefers to be seen sooner due to the size of the sigmoid polyp noted in the referral. Patient states he is comfortable to proceeding directly with the procedure, pending approval from Dr. Zamora and/or his colleagues. If that is not an option, the patient is requesting a virtual visit instead of an in-person consultation.    The patient was informed that the referral has been received and is still under review. He was advised that someone will follow up with him regarding either the procedure or the consultation, based on the recommendation of the advanced endoscopist reviewing his case. Patient agreed and verbalized understanding.

## 2025-04-11 ENCOUNTER — DOCUMENTATION (OUTPATIENT)
Dept: GASTROENTEROLOGY | Facility: HOSPITAL | Age: 65
End: 2025-04-11
Payer: COMMERCIAL

## 2025-04-11 ENCOUNTER — TELEPHONE (OUTPATIENT)
Dept: GASTROENTEROLOGY | Facility: CLINIC | Age: 65
End: 2025-04-11
Payer: COMMERCIAL

## 2025-04-11 DIAGNOSIS — Z12.11 COLON CANCER SCREENING: Primary | ICD-10-CM

## 2025-04-11 RX ORDER — SODIUM, POTASSIUM,MAG SULFATES 17.5-3.13G
0.51 SOLUTION, RECONSTITUTED, ORAL ORAL SEE ADMIN INSTRUCTIONS
Qty: 354 ML | Refills: 0 | Status: SHIPPED | OUTPATIENT
Start: 2025-04-11

## 2025-04-11 NOTE — PROGRESS NOTES
Dr. Loc Zamora's office received a referral for this patient from Dr. Slim Corley for a large polyp in the sigmoid colon. Pathology results came back as a tubular adenoma. Dr. Zamora reviewed the referral on 4/11/2025 and recommended that the patient first be scheduled for a virtual visit with him, followed by a colonoscopy with EMR. Spoke with patient via phone on 4/11/2025 to review Dr. Zamora's recommendations. Patient agreed and verbalized understanding.    Danica Elizondo and Maggie Saini were notified to call and schedule this patient. Contact Kendra Oviedo RN at [phone] for any questions.      Below is supporting clinical information from the referral sent by Dr. Corley's office and the patient's medical records.        Colonoscopy  Order# [id number]  Reading physician: Slim Corley MD      Ordering provider: Romulo LEBLANC DO   Study date: 3/10/25    Result Information    Status: Final result (Exam End: 3/10/2025 09:42)        Provider Status: Reviewed    Result Text    Result Text  Impression  -          Polyp in the cecum was removed with cold snare  -          Single malignant-appearing, friable, fungating and invasive mass measuring 6 cm in the sigmoid colon 40 cm from the anal verge; performed hot snare with partial piecemeal removal; tattooed proximal and distal to the finding       Findings  -          One sessile and adenomatous-appearing polyp measuring 5-9 mm in the cecum; performed cold snare with complete en bloc removal and retrieved specimen  -          Single malignant-appearing, friable, fungating and invasive mass (traversable) measuring 6 cm in the sigmoid colon 40 cm from the anal verge; performed hot snare with partial piecemeal removal and retrieved specimen; 3 tattoos were placed proximal and distal to the finding with 3 mL of zac ink. Large mass, possibly cancer, at about 40 cm from the anal verge.  Significantly debulked by hot snare and tattooed.      "  Recommendation    Follow up with me in clinic, due: 4/9/2025  Repeat colonoscopy in 1 year, due: 3/10/2026  ?          Personal history of colon polyps             Indication  Screening for colon cancer    Staff    Staff     Role  Slim Corley MD              Surgical Pathology Exam: [id number]  Order: [id number]   Collected 3/10/2025 09:00       Status: Edited Result - FINAL       Visible to patient: Yes (seen)       Dx: Screening for colon cancer    0 Result Notes                            Component                 Resulting Agency  FINAL DIAGNOSIS  A. COLON -CECUM POLYP: TUBULAR ADENOMA.       B. COLON - SIGMOID BIOPSY: TUBULAR ADENOMA.       Alexander Corcoran \"Bill\" to Radha King MA   [place]         "

## 2025-04-15 ENCOUNTER — OFFICE VISIT (OUTPATIENT)
Dept: GASTROENTEROLOGY | Facility: CLINIC | Age: 65
End: 2025-04-15
Payer: COMMERCIAL

## 2025-04-15 DIAGNOSIS — D12.5 ADENOMATOUS POLYP OF SIGMOID COLON: ICD-10-CM

## 2025-04-15 PROCEDURE — 3044F HG A1C LEVEL LT 7.0%: CPT | Performed by: INTERNAL MEDICINE

## 2025-04-15 PROCEDURE — 99202 OFFICE O/P NEW SF 15 MIN: CPT | Performed by: INTERNAL MEDICINE

## 2025-04-15 PROCEDURE — 99212 OFFICE O/P EST SF 10 MIN: CPT | Mod: 95 | Performed by: INTERNAL MEDICINE

## 2025-04-15 NOTE — PROGRESS NOTES
Subjective   Patient ID: Lul Corcoran is a 64 y.o. male.    Referred by Dr. Corley regarding large sigmoid colon TA incompletely removed during screening colonoscopy    Virtual or Telephone Consent    An interactive audio and video telecommunication system which permits real time communications between the patient (at the originating site) and provider (at the distant site) was utilized to provide this telehealth service.   Verbal consent was requested and obtained from Alexander Corcoran on this date, 04/15/25 for a telehealth visit and the patient's location was confirmed at the time of the visit.    He had a normal colonoscopy in 2015 with Dr. Allen  He is motivated to have endoscopic resection of the polyp base  Explained follow up in 1 year provided successful resection and no cancer    He has about 90% success with 10% all else including complications   Procedure ordered   Bowel prep sent  All questions answered  He is a  in [place]  [place]                   Review of Systems    Objective   Physical Exam  Constitutional:       Appearance: Normal appearance.   Neurological:      Mental Status: He is alert.   Psychiatric:         Mood and Affect: Mood normal.         Behavior: Behavior normal.         Thought Content: Thought content normal.         Judgment: Judgment normal.         Assessment/Plan   Diagnoses and all orders for this visit:  Adenomatous polyp of sigmoid colon  -     Referral to Gastroenterology    We discussed the risks and benefits of colonoscopy resection of the remainder of the colon polyp    Loc Zamora,       
Yes/kristin [phone]

## 2025-05-04 ENCOUNTER — HOSPITAL ENCOUNTER (EMERGENCY)
Facility: HOSPITAL | Age: 65
Discharge: HOME | End: 2025-05-04
Payer: COMMERCIAL

## 2025-05-04 VITALS
RESPIRATION RATE: 18 BRPM | DIASTOLIC BLOOD PRESSURE: 72 MMHG | TEMPERATURE: 97 F | SYSTOLIC BLOOD PRESSURE: 116 MMHG | BODY MASS INDEX: 27.25 KG/M2 | HEART RATE: 99 BPM | HEIGHT: 69 IN | OXYGEN SATURATION: 96 % | WEIGHT: 184 LBS
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
## 2025-05-04 PROCEDURE — 2500000001 HC RX 250 WO HCPCS SELF ADMINISTERED DRUGS (ALT 637 FOR MEDICARE OP): Performed by: NURSE PRACTITIONER

## 2025-05-04 PROCEDURE — 99283 EMERGENCY DEPT VISIT LOW MDM: CPT

## 2025-05-04 PROCEDURE — 36415 COLL VENOUS BLD VENIPUNCTURE: CPT | Performed by: NURSE PRACTITIONER

## 2025-05-04 PROCEDURE — 10121 INC&RMVL FB SUBQ TISS COMP: CPT

## 2025-05-04 PROCEDURE — 86618 LYME DISEASE ANTIBODY: CPT | Performed by: NURSE PRACTITIONER

## 2025-05-04 RX ORDER — DOXYCYCLINE HYCLATE 100 MG
200 TABLET ORAL ONCE
Status: COMPLETED | OUTPATIENT
Start: 2025-05-04 | End: 2025-05-04

## 2025-05-04 RX ADMIN — DOXYCYCLINE HYCLATE 200 MG: 100 TABLET, COATED ORAL at 20:41

## 2025-05-04 ASSESSMENT — PAIN SCALES - GENERAL: PAINLEVEL_OUTOF10: 0 - NO PAIN

## 2025-05-04 ASSESSMENT — LIFESTYLE VARIABLES
TOTAL SCORE: 0
EVER HAD A DRINK FIRST THING IN THE MORNING TO STEADY YOUR NERVES TO GET RID OF A HANGOVER: NO
HAVE PEOPLE ANNOYED YOU BY CRITICIZING YOUR DRINKING: NO
EVER FELT BAD OR GUILTY ABOUT YOUR DRINKING: NO
HAVE YOU EVER FELT YOU SHOULD CUT DOWN ON YOUR DRINKING: NO

## 2025-05-04 ASSESSMENT — PAIN - FUNCTIONAL ASSESSMENT: PAIN_FUNCTIONAL_ASSESSMENT: 0-10

## 2025-05-05 DIAGNOSIS — S40.869D TICK BITE OF UPPER ARM, UNSPECIFIED LATERALITY, SUBSEQUENT ENCOUNTER: Primary | ICD-10-CM
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
## 2025-05-05 NOTE — ED PROCEDURE NOTE
Procedure  Foreign Body Removal - Embedded    Performed by: PARKER Finn  Authorized by: PARKER Finn    Consent:     Consent obtained:  Verbal    Consent given by:  Patient    Risks, benefits, and alternatives were discussed: yes      Risks discussed:  Bleeding, infection, incomplete removal and pain  Universal protocol:     Procedure explained and questions answered to patient or proxy's satisfaction: yes      Relevant documents present and verified: yes      Patient identity confirmed:  Verbally with patient and arm band  Location:     Location:  Shoulder    Shoulder location:  R shoulder    Depth:  Intramuscular    Tendon involvement:  None  Pre-procedure details:     Imaging:  None    Neurovascular status: intact      Preparation: Patient was prepped and draped in usual sterile fashion    Anesthesia:     Anesthesia method:  Local infiltration    Local anesthetic:  Lidocaine 2% w/o epi  Procedure type:     Procedure complexity:  Complex  Procedure details:     Incision length:  0.25    Localization method:  Visualized    Dissection of underlying tissues: yes      Bloodless field: yes      Removal mechanism:  Forceps    Foreign bodies recovered:  1    Intact foreign body removal: yes    Post-procedure details:     Neurovascular status: intact      Confirmation:  No additional foreign bodies on visualization    Skin closure:  None    Dressing:  Non-adherent dressing and antibiotic ointment    Procedure completion:  Tolerated well, no immediate complications             PARKER Finn  05/05/25 0936

## 2025-05-05 NOTE — ED PROVIDER NOTES
[facility]  ED  Encounter Note  Admit Date/RoomTime: 2025  7:42 PM  [facility]  NAME: Alexander Corcoran  : 1960  MRN: [id number]     Chief Complaint:  Insect Bite (Tick bite, right upper chest. )    HISTORY OF PRESENT ILLNESS        Alexander Corcoran is a 64 y.o. male who presents to the ED for evaluation of tick bite right upper chest wall near shoulder. Patient was hiking all weekend pulled off numerous ticks. Tetanus is up to date. This tick is buried and is more so after he just showered pta.     ROS   Pertinent positives and negatives are stated within HPI, all other systems reviewed and are negative.    Past Medical History:  has a past medical history of DM (diabetes mellitus), type 2, Encounter for immunization (2015), Encounter for screening for cardiovascular disorders (2015), Encounter for screening for diabetes mellitus (2015), Encounter for screening for lipoid disorders (2015), Encounter for screening for malignant neoplasm of prostate (2015), Encounter for screening for malignant neoplasm of prostate (2017), HLD (hyperlipidemia), Lyme disease, unspecified, Neuropathy, SHANICE (obstructive sleep apnea), Other hemorrhoids (2015), Pain in unspecified joint (2017), Personal history of other diseases of the respiratory system (2016), Personal history of other endocrine, nutritional and metabolic disease (2015), Personal history of other endocrine, nutritional and metabolic disease (2017), Personal history of other infectious and parasitic diseases (2016), Personal history of other specified conditions (2017), and Unspecified otitis externa, unspecified ear (2018).    Surgical History:  has a past surgical history that includes Vasectomy (2015); Colonoscopy (2015); and Other surgical history (2021).    Social History:  reports that he has been smoking  cigarettes. He has never used smokeless tobacco. He reports that he does not drink alcohol and does not use drugs.    Family History: family history is not on file.     Allergies: Patient has no known allergies.    PHYSICAL EXAM   Oxygen Saturation Interpretation: Normal.     Physical Exam  Right upper chest wall near shoulder buried engorged tick  Constitutional/General: Alert and oriented x3, well appearing, non toxic  HEENT:  NC/NT. PERRLA.  Airway patent.  Neck: Supple, full ROM. No midline vertebral tenderness or crepitus.   Respiratory: Lung sounds clear to auscultation bilaterally. No wheezes, rhonchi or stridor. Not in respiratory distress.  CV:  Regular rate. Regular rhythm. No murmurs or rubs. 2+ distal pulses.  GI:  Abdomen soft, non-tender, non-distended. +BS. No rebound, guarding, or rigidity. No pulsatile masses.  Musculoskeletal: Moves all extremities x 4. Warm and well perfused. Capillary refill <3 seconds  Integument: Skin warm and dry. No rashes.   Neurologic: Alert and oriented with no focal deficits, symmetric strength 5/5 in the upper and lower extremities bilaterally.  Psychiatric: Normal affect.    Lab / Imaging Results   (All laboratory and radiology results have been personally reviewed by myself)  Labs:    Imaging:  All Radiology results interpreted by Radiologist unless otherwise noted.  No orders to display       ED Course / Medical Decision Making     Medications   doxycycline (Vibra-Tabs) tablet 200 mg (200 mg oral Given 5/4/25 2041)     Diagnoses as of 05/05/25 0939   Tick bite, unspecified site, initial encounter         Procedure(s):   Tick removal    MDM:       Alexander Corcoran is a 64 y.o. male who presents to the ED for evaluation of tick bite right upper chest wall near shoulder. Patient was hiking all weekend pulled off numerous ticks. Tetanus is up to date. This tick is buried and is more so after he just showered pta.     ED course stable  Tick removal, complex  Doxy 200 mg po   xone  Lab today  See pcp in followup  Local wound care  Monitor for bull eye  Return if needed  Ddx: tick bite     Plan of Care/Counseling:  I reviewed today's visit with the patient and wife  in addition to providing specific details for the plan of care and counseling regarding the diagnosis and prognosis.  Questions are answered at this time and are agreeable with the plan.    ASSESSMENT     1. Tick bite, unspecified site, initial encounter      PLAN   Home Advised to return for signs of head injury, weakness, numbness or tingling to extremities, incontinence and Advised to return for worsening or additional problems such as abdominal or chest pain  Diagnostic tests were reviewed and questions answered. Diagnosis, care plan and treatment options were discussed. The patient and spouse/SO understand instructions and will follow up as directed.  Condition stable  The patient and spouse was given verbal follow-up instructions  Patient condition is stable    New Medications     New Medications Ordered This Visit   Medications    doxycycline (Vibra-Tabs) tablet 200 mg     Suspected Indication (Select all that apply):   Other     Specify:   tick     Type of Therapy:   Empiric     Electronically signed by PARKER Finn   D**This report was transcribed using voice recognition software. Every effort was made to ensure accuracy; however, inadvertent computerized transcription errors may be present.  END OF ED PROVIDER NOTE     PARKER Finn  05/05/25 0347

## 2025-05-05 NOTE — DISCHARGE INSTRUCTIONS
See doctor in a few weeks for repeat lyme testing    Check results on line in the next few days    Monitor for bulls eye

## 2025-05-06 LAB — B BURGDOR.VLSE1+PEPC10 AB SER IA-ACNC: 0.14 IV

## 2025-05-08 ENCOUNTER — DOCUMENTATION (OUTPATIENT)
Dept: PRIMARY CARE | Facility: CLINIC | Age: 65
End: 2025-05-08
Payer: COMMERCIAL

## 2025-05-15 ENCOUNTER — ANESTHESIA (OUTPATIENT)
Dept: GASTROENTEROLOGY | Facility: HOSPITAL | Age: 65
End: 2025-05-15
Payer: COMMERCIAL

## 2025-05-15 ENCOUNTER — HOSPITAL ENCOUNTER (OUTPATIENT)
Dept: GASTROENTEROLOGY | Facility: HOSPITAL | Age: 65
Discharge: HOME | End: 2025-05-15
Payer: COMMERCIAL

## 2025-05-15 ENCOUNTER — ANESTHESIA EVENT (OUTPATIENT)
Dept: GASTROENTEROLOGY | Facility: HOSPITAL | Age: 65
End: 2025-05-15
Payer: COMMERCIAL

## 2025-05-15 VITALS
RESPIRATION RATE: 14 BRPM | BODY MASS INDEX: 27.04 KG/M2 | TEMPERATURE: 96.8 F | WEIGHT: 182.54 LBS | HEIGHT: 69 IN | HEART RATE: 87 BPM | OXYGEN SATURATION: 96 % | SYSTOLIC BLOOD PRESSURE: 123 MMHG | DIASTOLIC BLOOD PRESSURE: 81 MMHG

## 2025-05-15 DIAGNOSIS — K63.5 POLYP OF COLON, UNSPECIFIED PART OF COLON, UNSPECIFIED TYPE: ICD-10-CM

## 2025-05-15 PROBLEM — G62.9 PERIPHERAL NEUROPATHY: Status: ACTIVE | Noted: 2025-05-15

## 2025-05-15 LAB
GLUCOSE BLD MANUAL STRIP-MCNC: 104 MG/DL (ref 74–99)
GLUCOSE BLD MANUAL STRIP-MCNC: 105 MG/DL (ref 74–99)

## 2025-05-15 PROCEDURE — 2720000007 HC OR 272 NO HCPCS

## 2025-05-15 PROCEDURE — 7100000009 HC PHASE TWO TIME - INITIAL BASE CHARGE

## 2025-05-15 PROCEDURE — 3700000002 HC GENERAL ANESTHESIA TIME - EACH INCREMENTAL 1 MINUTE

## 2025-05-15 PROCEDURE — 7100000010 HC PHASE TWO TIME - EACH INCREMENTAL 1 MINUTE

## 2025-05-15 PROCEDURE — A45390 PR COLONOSCOPY FLX W/ENDOSCOPIC MUCOSAL RESECTION: Performed by: STUDENT IN AN ORGANIZED HEALTH CARE EDUCATION/TRAINING PROGRAM

## 2025-05-15 PROCEDURE — 82947 ASSAY GLUCOSE BLOOD QUANT: CPT

## 2025-05-15 PROCEDURE — 45390 COLONOSCOPY W/RESECTION: CPT | Performed by: INTERNAL MEDICINE

## 2025-05-15 PROCEDURE — 3700000001 HC GENERAL ANESTHESIA TIME - INITIAL BASE CHARGE

## 2025-05-15 PROCEDURE — 2500000004 HC RX 250 GENERAL PHARMACY W/ HCPCS (ALT 636 FOR OP/ED): Mod: JW | Performed by: ANESTHESIOLOGIST ASSISTANT

## 2025-05-15 PROCEDURE — A45390 PR COLONOSCOPY FLX W/ENDOSCOPIC MUCOSAL RESECTION: Performed by: ANESTHESIOLOGIST ASSISTANT

## 2025-05-15 RX ORDER — PROPOFOL 10 MG/ML
INJECTION, EMULSION INTRAVENOUS CONTINUOUS PRN
Status: DISCONTINUED | OUTPATIENT
Start: 2025-05-15 | End: 2025-05-15

## 2025-05-15 RX ORDER — FENTANYL CITRATE 50 UG/ML
INJECTION, SOLUTION INTRAMUSCULAR; INTRAVENOUS AS NEEDED
Status: DISCONTINUED | OUTPATIENT
Start: 2025-05-15 | End: 2025-05-15

## 2025-05-15 RX ORDER — MIDAZOLAM HYDROCHLORIDE 1 MG/ML
INJECTION INTRAMUSCULAR; INTRAVENOUS AS NEEDED
Status: DISCONTINUED | OUTPATIENT
Start: 2025-05-15 | End: 2025-05-15

## 2025-05-15 RX ADMIN — PROPOFOL 300 MCG/KG/MIN: 10 INJECTION, EMULSION INTRAVENOUS at 13:53

## 2025-05-15 RX ADMIN — FENTANYL CITRATE 50 MCG: 50 INJECTION, SOLUTION INTRAMUSCULAR; INTRAVENOUS at 13:54

## 2025-05-15 RX ADMIN — FENTANYL CITRATE 25 MCG: 50 INJECTION, SOLUTION INTRAMUSCULAR; INTRAVENOUS at 14:00

## 2025-05-15 RX ADMIN — MIDAZOLAM HYDROCHLORIDE 2 MG: 1 INJECTION, SOLUTION INTRAMUSCULAR; INTRAVENOUS at 13:42

## 2025-05-15 ASSESSMENT — PAIN SCALES - GENERAL
PAINLEVEL_OUTOF10: 0 - NO PAIN

## 2025-05-15 ASSESSMENT — PAIN - FUNCTIONAL ASSESSMENT
PAIN_FUNCTIONAL_ASSESSMENT: 0-10
PAIN_FUNCTIONAL_ASSESSMENT: VAS (VISUAL ANALOG SCALE)
PAIN_FUNCTIONAL_ASSESSMENT: 0-10

## 2025-05-15 NOTE — ANESTHESIA POSTPROCEDURE EVALUATION
"Patient: Alexander Corcoran \"Bill\"    Procedure Summary       Date: 05/15/25 Room / Location: [facility]    Anesthesia Start: 1339 Anesthesia Stop: 1416    Procedure: COLONOSCOPY Diagnosis: Polyp of colon, unspecified part of colon, unspecified type    Scheduled Providers: Loc Zamora DO; Benson Melendez MD Responsible Provider: Slim Mendoza MD    Anesthesia Type: MAC ASA Status: 3            Anesthesia Type: MAC    Vitals Value Taken Time   /81 05/15/25 14:46   Temp 36 °C (96.8 °F) 05/15/25 14:46   Pulse 87 05/15/25 14:46   Resp 14 05/15/25 14:46   SpO2 96 % 05/15/25 14:46       Anesthesia Post Evaluation    Patient location during evaluation: bedside  Patient participation: complete - patient participated  Level of consciousness: awake  Pain management: adequate  Multimodal analgesia pain management approach  Airway patency: patent  Cardiovascular status: stable  Respiratory status: spontaneous ventilation and unassisted  Hydration status: acceptable  Postoperative Nausea and Vomiting: none  Comments: No significant PONV.        No notable events documented.    "

## 2025-05-15 NOTE — DISCHARGE INSTRUCTIONS

## 2025-05-15 NOTE — ANESTHESIA PREPROCEDURE EVALUATION
"Patient: Alexander Corcoran \"Lul\"    Procedure Information       Date/Time: 05/15/25 1350    Scheduled providers: Loc Zamora DO; Benson Melendez MD    Procedure: COLONOSCOPY    Location: [facility]            Relevant Problems   Anesthesia (within normal limits)      Cardiac   (+) Hyperlipidemia      Pulmonary   (+) COPD (chronic obstructive pulmonary disease) (Multi)   (+) SHANICE (obstructive sleep apnea) (Noncompliant w CPAP)      Neuro   (+) Peripheral neuropathy      GI  Colon polyp  Colon mass      /Renal (within normal limits)      Liver (within normal limits)      Endocrine   (+) Diabetes mellitus, type 2 (Multi)   (+) Diabetic neuropathy (Multi)      Hematology  Hx polycythemia                                                         Pre-Anesthesia Evaluation                                         Alexander Corcoran \"Lul\" is a 64 y.o. male who presents for the above mentioned procedure due to Polyp of colon, unspecified part of colon, unspecified type [K63.5]    Medical History[1]  Surgical History[2]  Social History[3]  RX Allergies[4]  Current Medications[5]  Prior to Admission medications    Medication Sig Start Date End Date Taking? Authorizing Provider   aspirin 81 mg EC tablet TAKE 1 TABLET (81 MG) BY MOUTH ONCE DAILY. AS DIRECTED 9/13/24   Romulo Sanchantell V, DO   atorvastatin (Lipitor) 40 mg tablet TAKE 1 TABLET BY MOUTH EVERY DAY 8/1/24   Romulo Hassan V, DO   metFORMIN (Glucophage) 1,000 mg tablet Take 1 tablet (1,000 mg) by mouth every 12 hours. 1/31/25   Romulo Sanchantell V, DO   semaglutide (Ozempic) 0.25 mg or 0.5 mg (2 mg/3 mL) pen injector INJECT 0.5MG UNDER THE SKIN EVERY 7 DAYS 2/6/25   Romulo Hassan V, DO   sodium,potassium,mag sulfates (Suprep) 17.5-3.13-1.6 gram solution Take 1 bottle by mouth see administration instructions. 4/11/25   Loc Zamora DO     No medication comments found.  Visit Vitals  Smoking Status Every Day                             5/4/2025     7:40 PM " "3/10/2025     9:58 AM 3/10/2025     9:43 AM 3/10/2025     7:51 AM 1/31/2025     2:35 PM 9/20/2024     1:02 PM 8/9/2024    12:59 PM   BP REVIEW   BP (ultimate) 116/72 120/80 104/67 130/77 116/74 122/78 128/82     Recent Labs     08/09/24  1254 01/13/23  1107   WBC 10.1 7.9   HGB 15.9 15.8   HCT 50.5 48.3    275   MCV 94 90     Recent Labs     08/09/24  1254 01/13/23  1107   EGFR >90  --    ANIONGAP 14 12   BUN 15 15   CREATININE 0.68 0.85    137   K 4.9 5.0    102   CO2 25 28   GLUCOSE 100* 171*   CALCIUM 9.5 9.8     Recent Labs     01/31/25  1446 06/29/23  1407 11/02/18  0845 11/13/17  1407   HGBA1C 6.4   < >  --   --    TSH  --   --  1.59 1.11    < > = values in this interval not displayed.     Recent Labs     08/09/24  1254 01/13/23  1107   BILITOT 0.6 0.6   PROT 6.3* 6.7   ALBUMIN 3.8 4.1   ALKPHOS 56 49   ALT 17 18   AST 20 15     No results for input(s): \"PROTIME\", \"INR\" in the last 80499 hours.  Lab Results   Component Value Date    FERRITIN 231 05/13/2019    TIBC 351 05/13/2019    IRONSAT 33 05/13/2019     No results found for: \"STAPHMRSASCR\"  No results for input(s): \"AMPHETAMINE\", \"MAMPHBLDS\", \"BARBITURATE\", \"BENZODIAZ\", \"BUPRENBLDS\", \"CANNABBLDS\", \"COCBLDS\", \"METHABLDS\", \"OXYBLDS\", \"PCPBLDS\", \"OPIATBLDS\", \"DRBLDCOMM\" in the last 12671 hours.  No results for input(s): \"PHART\", \"TWI6KDM\", \"PO2ART\", \"YPQ7CLR\", \"Z7GGKIMC\", \"BEART\", \"S3WHQXCU\" in the last 93603 hours.      No results found for: \"ABO\"  EKG No results found for this or any previous visit (from the past 4464 hours).  Ejection Fractions:No results found for: \"EF\"  EchocardiogramNo results found for this or any previous visit from the past 44179 days.    Stress TestingNo results found for this or any previous visit from the past 79329 days.    Cardiac CatheterizationNo results found for this or any previous visit from the past 12383 days.    Cardiac Scoring   CT cardiac scoring wo IV contrast 01/10/2025    Narrative  Interpreted " By:  Esetban Nagy,  STUDY:  CT CARDIAC SCORING WO IV CONTRAST; 1/10/2025 7:13 am    INDICATION:  Signs/Symptoms:tob use, dm.    COMPARISON:  None.    ACCESSION NUMBER(S):  [id number]    ORDERING CLINICIAN:  LEONARDO JUÁREZ    TECHNIQUE:  Using prospective ECG gating, CT scan of the coronary arteries was  performed without intravenous contrast. Coronary calcium scoring  was  performed according to the method of Agatston.    FINDINGS:  The score and distribution of calcium in the coronary arteries is as  follows:    LM 0,  .71,  LCx 61.34,  .21,    Total 424.26    The visualized ascending thoracic aorta measures 3.4 cm in diameter.  The heart is normal in size. Stable anterior pericardial fluid most  likely reactive..    No gross evidence of mediastinal or hilar lymphadenopathy or masses  is identified. The visualized segments of the lungs are normally  expanded.    The main pulmonary artery, right and left pulmonary artery are normal  in size.    The visualized subdiaphragmatic structures appear intact.    Impression  1. Coronary artery calcium score of 424.26.  2. This represents a moderate increase in atherosclerotic  calcification when compared to a study of 02/02/2019.    *Coronary Artery  Agatston score    Score  risk  Very low 1-99  Mildly increased 100-299  Moderately increased >300  Moderate to severely increased >800    Randall et al. JCCT 2016 (http://dx.doi.org/10.1016/j.[email or web address])    GODOY 10-Year CHD Risk with Coronary Artery Calcification can be  calcuate using link below  [email or web address]  Marcela kee al. JACC 2015 (http://dx.doi.org/10.1016/j.j  [email or web address])    Signed by: Esteban Nagy 1/10/2025 9:09 AM  Dictation workstation:   [id number]    AAA screenNo results found for this or any previous visit from the past 58727 days.    Carotid DopplerNo results found for this or any previous visit from the past 84874  "days.    X Ray === 02/20/23 ===    FINGERS & HAND    - Impression -  No acute findings.  Pulmonary Function Tests  No results found for: \"FEV1\", \"FVC\", \"EFG8NGV\", \"TLC\", \"DLCO\"   OTHER: No results found for this or any previous visit from the past 1825 days.        No results found for: \"PREGTESTUR\", \"PREGSERUM\", \"HCG\", \"HCGQUANT\"  The ASCVD Risk score (Memo MCGOVERN, et al., 2019) failed to calculate for the following reasons:    The valid total cholesterol range is 130 to 320 mg/dL    Code Status: Assume Full                   Clinical information reviewed:                   NPO Detail:  No data recorded     Physical Exam    Airway  Mallampati: II     Cardiovascular    Dental   Comments: Missing numerous teeth, very poor cond     Pulmonary    Abdominal            Anesthesia Plan    History of general anesthesia?: yes  History of complications of general anesthesia?: no    ASA 3     MAC     intravenous induction   Anesthetic plan and risks discussed with patient.           [1]   Past Medical History:  Diagnosis Date    DM (diabetes mellitus), type 2     Encounter for immunization 01/19/2015    Need for Tdap vaccination    Encounter for screening for cardiovascular disorders 01/19/2015    Encounter for screening for cardiovascular disorders    Encounter for screening for diabetes mellitus 01/19/2015    Encounter for screening for diabetes mellitus    Encounter for screening for lipoid disorders 01/19/2015    Encounter for screening for lipoid disorders    Encounter for screening for malignant neoplasm of prostate 05/12/2015    Encounter for screening for malignant neoplasm of prostate    Encounter for screening for malignant neoplasm of prostate 11/13/2017    Prostate cancer screening    HLD (hyperlipidemia)     Lyme disease, unspecified     Erythema migrans (Lyme disease)    Neuropathy     SHANICE (obstructive sleep apnea)     mild    Other hemorrhoids 02/16/2015    Internal hemorrhoids    Pain in unspecified joint " 11/13/2017    Multiple joint pain    Personal history of other diseases of the respiratory system 02/09/2016    History of influenza    Personal history of other endocrine, nutritional and metabolic disease 05/12/2015    History of hyperglycemia    Personal history of other endocrine, nutritional and metabolic disease 11/13/2017    History of hyperglycemia    Personal history of other infectious and parasitic diseases 12/02/2016    History of herpes zoster    Personal history of other specified conditions 11/13/2017    History of hemoptysis    Unspecified otitis externa, unspecified ear 11/02/2018    Otitis externa   [2]   Past Surgical History:  Procedure Laterality Date    COLONOSCOPY  02/16/2015    Complete Colonoscopy    OTHER SURGICAL HISTORY  09/29/2021    Foot surgery    VASECTOMY  01/19/2015    Surgery Vas Deferens Vasectomy   [3]   Social History  Tobacco Use    Smoking status: Every Day     Current packs/day: 0.75     Types: Cigarettes    Smokeless tobacco: Never   Vaping Use    Vaping status: Never Used   Substance Use Topics    Alcohol use: Never    Drug use: Never   [4] No Known Allergies  [5]   Current Outpatient Medications:     aspirin 81 mg EC tablet, TAKE 1 TABLET (81 MG) BY MOUTH ONCE DAILY. AS DIRECTED, Disp: 90 tablet, Rfl: 3    atorvastatin (Lipitor) 40 mg tablet, TAKE 1 TABLET BY MOUTH EVERY DAY, Disp: 90 tablet, Rfl: 3    metFORMIN (Glucophage) 1,000 mg tablet, Take 1 tablet (1,000 mg) by mouth every 12 hours., Disp: 180 tablet, Rfl: 1    semaglutide (Ozempic) 0.25 mg or 0.5 mg (2 mg/3 mL) pen injector, INJECT 0.5MG UNDER THE SKIN EVERY 7 DAYS, Disp: 3 mL, Rfl: 8    sodium,potassium,mag sulfates (Suprep) 17.5-3.13-1.6 gram solution, Take 1 bottle by mouth see administration instructions., Disp: 354 mL, Rfl: 0

## 2025-05-16 ASSESSMENT — PAIN SCALES - GENERAL: PAINLEVEL_OUTOF10: 0 - NO PAIN

## 2025-05-23 LAB
LABORATORY COMMENT REPORT: NORMAL
PATH REPORT.FINAL DX SPEC: NORMAL
PATH REPORT.GROSS SPEC: NORMAL
PATH REPORT.TOTAL CANCER: NORMAL

## 2025-06-09 ENCOUNTER — APPOINTMENT (OUTPATIENT)
Dept: PRIMARY CARE | Facility: CLINIC | Age: 65
End: 2025-06-09
Payer: COMMERCIAL

## 2025-07-21 DIAGNOSIS — E11.9 TYPE 2 DIABETES MELLITUS WITHOUT COMPLICATIONS: ICD-10-CM

## 2025-07-21 RX ORDER — ATORVASTATIN CALCIUM 40 MG/1
40 TABLET, FILM COATED ORAL DAILY
Qty: 90 TABLET | Refills: 3 | Status: SHIPPED | OUTPATIENT
Start: 2025-07-21

## 2025-08-01 ENCOUNTER — APPOINTMENT (OUTPATIENT)
Dept: PRIMARY CARE | Facility: CLINIC | Age: 65
End: 2025-08-01
Payer: COMMERCIAL

## 2025-08-04 ASSESSMENT — ENCOUNTER SYMPTOMS
POLYDIPSIA: 0
WEIGHT LOSS: 1
HUNGER: 0
DIZZINESS: 0
POLYPHAGIA: 0
SWEATS: 0
WEAKNESS: 1
BLACKOUTS: 0
CONFUSION: 0
VISUAL CHANGE: 0
FATIGUE: 1
SPEECH DIFFICULTY: 0
HEADACHES: 0
NERVOUS/ANXIOUS: 0
BLURRED VISION: 0
SEIZURES: 0
TREMORS: 0

## 2025-08-05 ENCOUNTER — APPOINTMENT (OUTPATIENT)
Dept: PRIMARY CARE | Facility: CLINIC | Age: 65
End: 2025-08-05
Payer: COMMERCIAL

## 2025-08-05 VITALS
OXYGEN SATURATION: 97 % | DIASTOLIC BLOOD PRESSURE: 82 MMHG | WEIGHT: 190 LBS | BODY MASS INDEX: 28.06 KG/M2 | SYSTOLIC BLOOD PRESSURE: 132 MMHG | HEART RATE: 88 BPM

## 2025-08-05 DIAGNOSIS — E11.9 TYPE 2 DIABETES MELLITUS WITHOUT COMPLICATION, UNSPECIFIED WHETHER LONG TERM INSULIN USE: Primary | ICD-10-CM

## 2025-08-05 DIAGNOSIS — E78.5 HYPERLIPIDEMIA, UNSPECIFIED HYPERLIPIDEMIA TYPE: ICD-10-CM

## 2025-08-05 DIAGNOSIS — J44.9 CHRONIC OBSTRUCTIVE PULMONARY DISEASE, UNSPECIFIED COPD TYPE (MULTI): ICD-10-CM

## 2025-08-05 DIAGNOSIS — F17.200 NICOTINE DEPENDENCE WITH CURRENT USE: ICD-10-CM

## 2025-08-05 DIAGNOSIS — Z12.5 SCREENING FOR MALIGNANT NEOPLASM OF PROSTATE: ICD-10-CM

## 2025-08-05 LAB — POC HEMOGLOBIN A1C: 6.3 % (ref 4.2–6.5)

## 2025-08-05 PROCEDURE — 3079F DIAST BP 80-89 MM HG: CPT

## 2025-08-05 PROCEDURE — 99213 OFFICE O/P EST LOW 20 MIN: CPT

## 2025-08-05 PROCEDURE — 83036 HEMOGLOBIN GLYCOSYLATED A1C: CPT

## 2025-08-05 PROCEDURE — 3075F SYST BP GE 130 - 139MM HG: CPT

## 2025-08-05 PROCEDURE — 3044F HG A1C LEVEL LT 7.0%: CPT

## 2025-08-05 ASSESSMENT — ENCOUNTER SYMPTOMS
HEADACHES: 0
FATIGUE: 1
SPEECH DIFFICULTY: 0
SWEATS: 0
WEIGHT LOSS: 1
POLYDIPSIA: 0
VISUAL CHANGE: 0
HUNGER: 0
POLYPHAGIA: 0
TREMORS: 0
BLURRED VISION: 0
NERVOUS/ANXIOUS: 0
SEIZURES: 0
BLACKOUTS: 0
CONFUSION: 0
WEAKNESS: 1
DIZZINESS: 0

## 2025-08-05 NOTE — PATIENT INSTRUCTIONS
Quitting Smoking    Quitting smoking is the most important step you can take to improve your health. We're glad you have set a goal to improve your health.    Quit Smoking Resources    In addition to medications, use the STAR plan to help you successfully quit.   Stick with your quit date!   Tell friends, family, and coworkers your quit date. Request their understanding and support.  Anticipate and prepare for challenges. Some examples are withdrawal symptoms, being around others who smoke, and drinking alcohol.  Remove all tobacco products and paraphernalia from your environment. Make your home and vehicles smoke-free.    Free resources for additional support:  National tobacco quitline: 1-800-QUIT-NOW (1-543.877.1361).  SmokefreeTXT is a free text program to assist you in quitting. Visit https://www.smokefree.gov/smokefreetxt for more information.

## 2025-08-05 NOTE — PROGRESS NOTES
"Subjective   Patient ID: Alexander Corcoran \"Lul\" is a 65 y.o. male who presents for Follow-up (DM ck. Establish care, was pt of DS).  Diabetes  He has type 2 diabetes mellitus. No MedicAlert identification noted. The initial diagnosis of diabetes was made 5 years ago. Hypoglycemia symptoms include sleepiness. Pertinent negatives for hypoglycemia include no confusion, dizziness, headaches, hunger, mood changes, nervousness/anxiousness, pallor, seizures, speech difficulty, sweats or tremors. Associated symptoms include fatigue, foot paresthesias, weakness and weight loss. Pertinent negatives for diabetes include no blurred vision, no chest pain, no foot ulcerations, no polydipsia, no polyphagia, no polyuria and no visual change. Pertinent negatives for hypoglycemia complications include no blackouts, no hospitalization, no nocturnal hypoglycemia, no required assistance and no required glucagon injection. Symptoms are stable. Diabetic complications include peripheral neuropathy. Pertinent negatives for diabetic complications include no CVA, heart disease, impotence, nephropathy, PVD or retinopathy. Risk factors for coronary artery disease include dyslipidemia, family history, obesity, stress and tobacco exposure. Current diabetic treatment includes diet and oral agent (dual therapy). He is compliant with treatment all of the time. His weight is fluctuating minimally. He is following a generally healthy and high fiber diet. When asked about meal planning, he reported none. He has not had a previous visit with a dietitian. He participates in exercise three times a week. There is no compliance with monitoring of blood glucose. His home blood glucose trend is fluctuating minimally. His breakfast blood glucose is taken between 5-6 am. His lunch blood glucose is taken between 12-1 pm. His dinner blood glucose is taken between 6-7 pm. His bedtime blood glucose is taken between 10-11 pm. He does not see a podiatrist.Eye exam " is current.       Chronic Issues:  -DM maintenance:  - A1C: 6.4 -> 6.3  -Microalbumin: completed jan 2025  - Eye: [facility]    -Foot:   - Glucose monitoring:   - Current meds: metformin, ozempic 0.5 mg   -Previous meds:   - ACE/ARB:   - Statin- lipitor  - PCV- done 2014  - Flu- yearly    -Tobacco use- failed chantix   -Pneumonitis- infectious vs hypersensitivity vs interstitial, ct scan   -Mass on R shoulder- lipoma likely vs ganglion cyst   -R shoulder pain- followed by [facility]  - Adenomatous polyp found in march, resected in may. Recheck w colonoscopy 1 yr.  - BCC- resected from back yesterday     New concerns:  - none     Health Maintenance Reminder:  -Blood Work: today  -PSA: today  -Hep C: neg  -Colonoscopy: repeat 1 yr  -Lung Cancer: wants to wait until 2024 not covered   -AAA: 65-79 smoker.   -shingles: completed  -Pneumovax: 67y   -Flu: pharm       Current Medications[1]   Surgical History[2]   Medical History[3]  Social History[4]   Family History[5]   Review of Systems   Constitutional:  Positive for fatigue and weight loss.   Eyes:  Negative for blurred vision.   Cardiovascular:  Negative for chest pain.   Endocrine: Negative for polydipsia, polyphagia and polyuria.   Genitourinary:  Negative for impotence.   Skin:  Negative for pallor.   Neurological:  Positive for weakness. Negative for dizziness, tremors, seizures, speech difficulty and headaches.   Psychiatric/Behavioral:  Negative for confusion. The patient is not nervous/anxious.      10 point ROS negative except as otherwise noted in the HPI.      Objective   /82   Pulse 88   Wt 86.2 kg (190 lb)   SpO2 97%   BMI 28.06 kg/m²    Physical Exam  Constitutional:       Appearance: Normal appearance.   HENT:      Head: Normocephalic and atraumatic.     Eyes:      Extraocular Movements: Extraocular movements intact.      Pupils: Pupils are equal, round, and reactive to light.       Cardiovascular:      Rate and Rhythm: Normal rate  and regular rhythm.      Pulses: Normal pulses.      Heart sounds: Normal heart sounds.   Pulmonary:      Effort: Pulmonary effort is normal.      Breath sounds: Normal breath sounds.   Abdominal:      General: Abdomen is flat.      Palpations: Abdomen is soft.     Musculoskeletal:         General: Normal range of motion.      Right lower leg: No edema.      Left lower leg: No edema.     Skin:     General: Skin is warm and dry.      Findings: No rash.     Neurological:      General: No focal deficit present.      Mental Status: He is alert and oriented to person, place, and time.     Psychiatric:         Mood and Affect: Mood normal.         Behavior: Behavior normal.           Assessment/Plan   Problem List Items Addressed This Visit       RESOLVED: COPD (chronic obstructive pulmonary disease) (Multi)    Diabetes mellitus, type 2 (Multi) - Primary  A1c 6.3  Micro jan 2026  Continue metformin and ozempic  Continue statin     Relevant Orders    POCT glycosylated hemoglobin (Hb A1C) manually resulted (Completed)    Comprehensive Metabolic Panel    CBC and Auto Differential    Hyperlipidemia    Relevant Orders    Lipid Panel     Other Visit Diagnoses         Screening for malignant neoplasm of prostate        Relevant Orders    Prostate Specific Antigen      Nicotine dependence with current use         Fu for annual wellness visit   Labs at that time          Discussed at visit any disease processes that were of concern as well as the risks, benefits and instructions on any new medication provided. Patient (and/or caretaker of patient if present) stated all questions were answered, and they voiced understanding of instructions.     Lee Ann Connors PA-C   Tobacco Counseling  3 - 5 minutes were spent counseling the patient on tobacco cessation.  Benefits of cessation were discussed as well as techniques to help quit.          [1]   Current Outpatient Medications:     aspirin 81 mg EC tablet, TAKE 1 TABLET (81 MG) BY  MOUTH ONCE DAILY. AS DIRECTED, Disp: 90 tablet, Rfl: 3    atorvastatin (Lipitor) 40 mg tablet, TAKE 1 TABLET BY MOUTH EVERY DAY, Disp: 90 tablet, Rfl: 3    metFORMIN (Glucophage) 1,000 mg tablet, Take 1 tablet (1,000 mg) by mouth every 12 hours., Disp: 180 tablet, Rfl: 1    semaglutide (Ozempic) 0.25 mg or 0.5 mg (2 mg/3 mL) pen injector, INJECT 0.5MG UNDER THE SKIN EVERY 7 DAYS, Disp: 3 mL, Rfl: 8  [2]   Past Surgical History:  Procedure Laterality Date    CIRCUMCISION, PRIMARY      COLON SURGERY  06/2025    COLONOSCOPY  02/16/2015    Complete Colonoscopy    COSMETIC SURGERY  08/04/2025    OTHER SURGICAL HISTORY  09/29/2021    Foot surgery    VASECTOMY  01/19/2015    Surgery Vas Deferens Vasectomy    WISDOM TOOTH EXTRACTION     [3]   Past Medical History:  Diagnosis Date    Cancer (Multi) 07/15/2025    DM (diabetes mellitus), type 2     Encounter for immunization 01/19/2015    Need for Tdap vaccination    Encounter for screening for cardiovascular disorders 01/19/2015    Encounter for screening for cardiovascular disorders    Encounter for screening for diabetes mellitus 01/19/2015    Encounter for screening for diabetes mellitus    Encounter for screening for lipoid disorders 01/19/2015    Encounter for screening for lipoid disorders    Encounter for screening for malignant neoplasm of prostate 05/12/2015    Encounter for screening for malignant neoplasm of prostate    Encounter for screening for malignant neoplasm of prostate 11/13/2017    Prostate cancer screening    HLD (hyperlipidemia)     Lyme disease, unspecified     Erythema migrans (Lyme disease)    Neuropathy     SHANICE (obstructive sleep apnea)     mild    Other hemorrhoids 02/16/2015    Internal hemorrhoids    Pain in unspecified joint 11/13/2017    Multiple joint pain    Personal history of other diseases of the respiratory system 02/09/2016    History of influenza    Personal history of other endocrine, nutritional and metabolic disease 05/12/2015     History of hyperglycemia    Personal history of other endocrine, nutritional and metabolic disease 11/13/2017    History of hyperglycemia    Personal history of other infectious and parasitic diseases 12/02/2016    History of herpes zoster    Personal history of other specified conditions 11/13/2017    History of hemoptysis    Unspecified otitis externa, unspecified ear 11/02/2018    Otitis externa   [4]   Social History  Tobacco Use    Smoking status: Every Day     Current packs/day: 0.75     Average packs/day: 1 pack/day for 15.0 years (15.0 ttl pk-yrs)     Types: Cigarettes    Smokeless tobacco: Never   Vaping Use    Vaping status: Never Used   Substance Use Topics    Alcohol use: Yes     Alcohol/week: 3.0 standard drinks of alcohol     Types: 3 Cans of beer per week    Drug use: Never   [5]   Family History  Problem Relation Name Age of Onset    Cancer Mother marilou     Cancer Mother marilou

## 2025-08-09 DIAGNOSIS — E11.9 TYPE 2 DIABETES MELLITUS WITHOUT COMPLICATIONS: ICD-10-CM

## 2025-08-11 RX ORDER — METFORMIN HYDROCHLORIDE 1000 MG/1
1000 TABLET ORAL EVERY 12 HOURS
Qty: 180 TABLET | Refills: 1 | Status: SHIPPED | OUTPATIENT
Start: 2025-08-11

## 2025-09-04 DIAGNOSIS — E11.9 TYPE 2 DIABETES MELLITUS WITHOUT COMPLICATIONS: ICD-10-CM

## 2025-09-04 DIAGNOSIS — E78.2 MIXED HYPERLIPIDEMIA: ICD-10-CM

## 2025-09-04 RX ORDER — ASPIRIN 81 MG/1
81 TABLET ORAL DAILY
Qty: 90 TABLET | Refills: 3 | Status: SHIPPED | OUTPATIENT
Start: 2025-09-04

## 2025-09-16 ENCOUNTER — APPOINTMENT (OUTPATIENT)
Dept: GASTROENTEROLOGY | Facility: CLINIC | Age: 65
End: 2025-09-16
Payer: COMMERCIAL

## 2025-10-17 ENCOUNTER — APPOINTMENT (OUTPATIENT)
Dept: PRIMARY CARE | Facility: CLINIC | Age: 65
End: 2025-10-17
Payer: COMMERCIAL